# Patient Record
Sex: FEMALE | Race: BLACK OR AFRICAN AMERICAN | NOT HISPANIC OR LATINO | Employment: UNEMPLOYED | URBAN - METROPOLITAN AREA
[De-identification: names, ages, dates, MRNs, and addresses within clinical notes are randomized per-mention and may not be internally consistent; named-entity substitution may affect disease eponyms.]

---

## 2021-05-12 LAB — NONINV COLON CA DNA+OCC BLD SCRN STL QL: NEGATIVE

## 2021-06-02 LAB — BCS RECOMMENDATION EXT: NORMAL

## 2021-10-21 LAB
HUMAN PAPILLOMAVIRUS (HPV): NORMAL
PAP RECOMMENDATION EXT: NORMAL
PAP SMEAR: NORMAL

## 2021-12-15 LAB
BUN SERPL-MCNC: 20 MG/DL (ref 7–18)
CALCIUM SERPL-MCNC: 9.9 MG/DL (ref 8.5–10.1)
CHLORIDE SERPL-SCNC: 101 MMOL/L (ref 98–107)
CO2 SERPL-SCNC: 24 MMOL/L (ref 21–32)
CREAT SERPL-MCNC: 0.83 MG/DL (ref 0.6–1.3)
GLUCOSE SERPL-MCNC: 62 MG/DL (ref 74–106)
POTASSIUM SERPL-SCNC: 4.4 MMOL/L (ref 3.5–5.1)
SODIUM SERPL-SCNC: 140 MEQ/L (ref 131–145)

## 2021-12-21 LAB
LEFT EYE DM RETINOPATHY: NEGATIVE
RIGHT EYE DM RETINOPATHY: NEGATIVE

## 2022-01-13 ENCOUNTER — HISTORICAL (OUTPATIENT)
Dept: ADMINISTRATIVE | Facility: HOSPITAL | Age: 57
End: 2022-01-13

## 2022-02-08 ENCOUNTER — HISTORICAL (OUTPATIENT)
Dept: ADMINISTRATIVE | Facility: HOSPITAL | Age: 57
End: 2022-02-08

## 2022-04-07 ENCOUNTER — HISTORICAL (OUTPATIENT)
Dept: ADMINISTRATIVE | Facility: HOSPITAL | Age: 57
End: 2022-04-07

## 2022-04-24 VITALS
OXYGEN SATURATION: 99 % | BODY MASS INDEX: 24.84 KG/M2 | SYSTOLIC BLOOD PRESSURE: 117 MMHG | DIASTOLIC BLOOD PRESSURE: 75 MMHG | WEIGHT: 140.19 LBS | HEIGHT: 63 IN

## 2022-05-04 ENCOUNTER — TELEPHONE (OUTPATIENT)
Dept: FAMILY MEDICINE | Facility: CLINIC | Age: 57
End: 2022-05-04

## 2022-05-04 NOTE — TELEPHONE ENCOUNTER
----- Message from Dorie Royal sent at 5/4/2022  1:31 PM CDT -----  Regarding: Med refill  Pt is requesting a refill on Tramadol and Meloxicam.   [address]  [phone]

## 2022-05-18 ENCOUNTER — TELEPHONE (OUTPATIENT)
Dept: FAMILY MEDICINE | Facility: CLINIC | Age: 57
End: 2022-05-18

## 2022-05-18 ENCOUNTER — TELEPHONE (OUTPATIENT)
Dept: ADMINISTRATIVE | Facility: HOSPITAL | Age: 57
End: 2022-05-18

## 2022-05-18 RX ORDER — MELOXICAM 15 MG/1
15 TABLET ORAL DAILY
Qty: 30 TABLET | Refills: 0 | Status: SHIPPED | OUTPATIENT
Start: 2022-05-18 | End: 2022-07-06 | Stop reason: SDUPTHER

## 2022-06-30 ENCOUNTER — DOCUMENTATION ONLY (OUTPATIENT)
Dept: FAMILY MEDICINE | Facility: CLINIC | Age: 57
End: 2022-06-30
Payer: COMMERCIAL

## 2022-07-01 RX ORDER — BENAZEPRIL/HYDROCHLOROTHIAZIDE 20 MG-25MG
1 TABLET ORAL DAILY
COMMUNITY
Start: 2022-01-05 | End: 2023-01-04 | Stop reason: SDUPTHER

## 2022-07-01 RX ORDER — GLIPIZIDE 2.5 MG/1
2.5 TABLET, EXTENDED RELEASE ORAL DAILY
COMMUNITY
Start: 2021-12-08 | End: 2022-07-19

## 2022-07-01 RX ORDER — ERGOCALCIFEROL 1.25 MG/1
50000 CAPSULE ORAL
COMMUNITY
Start: 2022-02-01 | End: 2023-04-06 | Stop reason: SDUPTHER

## 2022-07-01 RX ORDER — EMPAGLIFLOZIN, LINAGLIPTIN, METFORMIN HYDROCHLORIDE 25; 5; 1000 MG/1; MG/1; MG/1
1 TABLET, EXTENDED RELEASE ORAL DAILY
COMMUNITY
Start: 2022-01-05 | End: 2022-07-06 | Stop reason: SDUPTHER

## 2022-07-01 RX ORDER — SIMVASTATIN 80 MG/1
80 TABLET, FILM COATED ORAL DAILY
COMMUNITY
Start: 2022-01-05 | End: 2023-01-04 | Stop reason: SDUPTHER

## 2022-07-06 ENCOUNTER — OFFICE VISIT (OUTPATIENT)
Dept: FAMILY MEDICINE | Facility: CLINIC | Age: 57
End: 2022-07-06
Payer: COMMERCIAL

## 2022-07-06 VITALS
HEART RATE: 76 BPM | SYSTOLIC BLOOD PRESSURE: 124 MMHG | DIASTOLIC BLOOD PRESSURE: 70 MMHG | RESPIRATION RATE: 18 BRPM | BODY MASS INDEX: 29.81 KG/M2 | OXYGEN SATURATION: 98 % | TEMPERATURE: 98 F | WEIGHT: 162 LBS | HEIGHT: 62 IN

## 2022-07-06 DIAGNOSIS — E11.59 HYPERTENSION ASSOCIATED WITH DIABETES: ICD-10-CM

## 2022-07-06 DIAGNOSIS — E11.69 HYPERLIPIDEMIA ASSOCIATED WITH TYPE 2 DIABETES MELLITUS: ICD-10-CM

## 2022-07-06 DIAGNOSIS — I15.2 HYPERTENSION ASSOCIATED WITH DIABETES: ICD-10-CM

## 2022-07-06 DIAGNOSIS — E11.65 CONTROLLED TYPE 2 DIABETES MELLITUS WITH HYPERGLYCEMIA, WITHOUT LONG-TERM CURRENT USE OF INSULIN: Primary | ICD-10-CM

## 2022-07-06 DIAGNOSIS — E78.5 HYPERLIPIDEMIA ASSOCIATED WITH TYPE 2 DIABETES MELLITUS: ICD-10-CM

## 2022-07-06 DIAGNOSIS — M54.16 BILATERAL LUMBAR RADICULOPATHY: ICD-10-CM

## 2022-07-06 PROCEDURE — 1160F PR REVIEW ALL MEDS BY PRESCRIBER/CLIN PHARMACIST DOCUMENTED: ICD-10-PCS | Mod: CPTII,,, | Performed by: FAMILY MEDICINE

## 2022-07-06 PROCEDURE — 3078F DIAST BP <80 MM HG: CPT | Mod: CPTII,,, | Performed by: FAMILY MEDICINE

## 2022-07-06 PROCEDURE — 99214 OFFICE O/P EST MOD 30 MIN: CPT | Mod: ,,, | Performed by: FAMILY MEDICINE

## 2022-07-06 PROCEDURE — 3008F BODY MASS INDEX DOCD: CPT | Mod: CPTII,,, | Performed by: FAMILY MEDICINE

## 2022-07-06 PROCEDURE — 3074F PR MOST RECENT SYSTOLIC BLOOD PRESSURE < 130 MM HG: ICD-10-PCS | Mod: CPTII,,, | Performed by: FAMILY MEDICINE

## 2022-07-06 PROCEDURE — 3074F SYST BP LT 130 MM HG: CPT | Mod: CPTII,,, | Performed by: FAMILY MEDICINE

## 2022-07-06 PROCEDURE — 3008F PR BODY MASS INDEX (BMI) DOCUMENTED: ICD-10-PCS | Mod: CPTII,,, | Performed by: FAMILY MEDICINE

## 2022-07-06 PROCEDURE — 1159F PR MEDICATION LIST DOCUMENTED IN MEDICAL RECORD: ICD-10-PCS | Mod: CPTII,,, | Performed by: FAMILY MEDICINE

## 2022-07-06 PROCEDURE — 1159F MED LIST DOCD IN RCRD: CPT | Mod: CPTII,,, | Performed by: FAMILY MEDICINE

## 2022-07-06 PROCEDURE — 99214 PR OFFICE/OUTPT VISIT, EST, LEVL IV, 30-39 MIN: ICD-10-PCS | Mod: ,,, | Performed by: FAMILY MEDICINE

## 2022-07-06 PROCEDURE — 3078F PR MOST RECENT DIASTOLIC BLOOD PRESSURE < 80 MM HG: ICD-10-PCS | Mod: CPTII,,, | Performed by: FAMILY MEDICINE

## 2022-07-06 PROCEDURE — 1160F RVW MEDS BY RX/DR IN RCRD: CPT | Mod: CPTII,,, | Performed by: FAMILY MEDICINE

## 2022-07-06 RX ORDER — TRAMADOL HYDROCHLORIDE 50 MG/1
50 TABLET ORAL EVERY 12 HOURS PRN
Qty: 60 TABLET | Refills: 1 | Status: SHIPPED | OUTPATIENT
Start: 2022-07-06 | End: 2022-09-04

## 2022-07-06 RX ORDER — PIOGLITAZONEHYDROCHLORIDE 15 MG/1
15 TABLET ORAL DAILY
COMMUNITY
End: 2022-07-06 | Stop reason: SINTOL

## 2022-07-06 RX ORDER — ASPIRIN 81 MG/1
81 TABLET ORAL DAILY
COMMUNITY

## 2022-07-06 RX ORDER — MELOXICAM 15 MG/1
15 TABLET ORAL DAILY PRN
Qty: 90 TABLET | Refills: 3 | Status: SHIPPED | OUTPATIENT
Start: 2022-07-06 | End: 2023-05-16

## 2022-07-06 RX ORDER — METFORMIN HYDROCHLORIDE 1000 MG/1
1000 TABLET ORAL 2 TIMES DAILY WITH MEALS
COMMUNITY
End: 2022-07-06 | Stop reason: SINTOL

## 2022-07-06 RX ORDER — FERROUS SULFATE 325(65) MG
325 TABLET, DELAYED RELEASE (ENTERIC COATED) ORAL
COMMUNITY
End: 2023-05-16

## 2022-07-06 RX ORDER — EMPAGLIFLOZIN, LINAGLIPTIN, METFORMIN HYDROCHLORIDE 25; 5; 1000 MG/1; MG/1; MG/1
1 TABLET, EXTENDED RELEASE ORAL DAILY
Qty: 90 TABLET | Refills: 3 | Status: SHIPPED | OUTPATIENT
Start: 2022-07-06 | End: 2022-07-18

## 2022-07-06 NOTE — PROGRESS NOTES
Patient ID: [id number]     Chief Complaint: Diabetes, Hyperlipidemia, and Hypertension        HPI:     Cathie Santos is a 56 y.o. female here today for a follow up.   - Here for followup DM II. DM II is stable and asymptomatic, but she reports side effects with Metformin, and Actos and wants to go back on Trijardy XR, which worked well for her in the past and she takes Glipizide as well without side effects, her fasting CBGs have been 100's, she admits that she has been non-compliant with diet, but plans to get back on track. She had labs done on 06/29/2022, here to discuss lab results. She is UTD on DM II eye exam.   - Here for followup hypercholesterolemia. She is compliant with diet and Rx as prescribed, no side effects. She will do her labs in 3 months with her next lab draw because labs weren't drawn on 06/29/2022.   - Here for followup HTN, BP is controlled with Rx, no side effects, asymptomatic, BP has been 120's/70's.   - She has chronic lumbar radiculopathy, controlled with Tramadol and Meloxicam for now, no side effects, she needs Rx refill today, she is still awaiting appointment with Neurosurgery, needs new referral.   - Patient is without any other complaints today.      Past Medical History:  Anemia, unspecified  HTN (hypertension)  Hypercholesterolemia  RUFINO (obstructive sleep apnea)  Type 2 diabetes mellitus without complications  Vitamin D deficiency     Past Surgical History:   Procedure Laterality Date    CHOLECYSTECTOMY         Review of patient's allergies indicates:  No Known Allergies    Outpatient Medications Marked as Taking for the 7/6/22 encounter (Office Visit) with Stacey Hendrickson MD   Medication Sig Dispense Refill    aspirin (ECOTRIN) 81 MG EC tablet Take 81 mg by mouth once daily.      benazepril-hydrochlorthiazide (LOTENSIN HCT) 20-25 mg Tab Take 1 tablet by mouth once daily.      ergocalciferol (ERGOCALCIFEROL) 50,000 unit Cap Take 50,000 Int'l Units by mouth every 7  days.      ferrous sulfate 325 (65 FE) MG EC tablet Take 325 mg by mouth 3 (three) times daily with meals.      glipiZIDE (GLUCOTROL) 2.5 MG TR24 Take 2.5 mg by mouth once daily.      simvastatin (ZOCOR) 80 MG tablet Take 80 mg by mouth once daily.      [DISCONTINUED] meloxicam (MOBIC) 15 MG tablet Take 1 tablet (15 mg total) by mouth once daily. 30 tablet 0    [DISCONTINUED] metFORMIN (GLUCOPHAGE) 1000 MG tablet Take 1,000 mg by mouth 2 (two) times daily with meals.      [DISCONTINUED] pioglitazone (ACTOS) 15 MG tablet Take 15 mg by mouth once daily.         Social History     Socioeconomic History    Marital status: Unknown   Tobacco Use    Smoking status: Never Smoker    Smokeless tobacco: Never Used   Substance and Sexual Activity    Alcohol use: Yes    Drug use: Never    Sexual activity: Yes        Family History   Problem Relation Age of Onset    Diabetes Mother     Diabetes Sister     Diabetes Brother         Subjective:       Review of Systems:    See HPI for details    Constitutional: Denies Change in appetite. Denies Chills. Denies Fever. Denies Night sweats.  Eye: Denies Blurred vision. Denies Discharge. Denies Eye pain.  ENT: Denies Decreased hearing. Denies Sore throat. Denies Swollen glands.  Respiratory: Denies Cough. Denies Shortness of breath. Denies Shortness of breath with exertion. Denies Wheezing.  Cardiovascular: Denies Chest pain at rest. Denies Chest pain with exertion. Denies Irregular heartbeat. Denies Palpitations.  Gastrointestinal: Denies Abdominal pain. Denies Diarrhea. Denies Nausea. Denies Vomiting. Denies Hematemesis or Hematochezia.  Genitourinary: Denies Dysuria. Denies Urinary frequency. Denies Urinary urgency. Denies Blood in urine.  Endocrine: Denies Cold intolerance. Denies Excessive thirst. Denies Heat intolerance. Denies Weight loss. Denies Weight gain.  Musculoskeletal: Denies Painful joints. Denies Weakness.  Integumentary: Denies Rash. Denies Itching. Denies  "Dry skin.  Neurologic: Denies Dizziness. Denies Fainting. Denies Headache.  Psychiatric: Denies Depression. Denies Anxiety. Denies Suicidal/Homicidal ideations.    All Other ROS: Negative except as stated in HPI.       Objective:     /70 (BP Location: Right arm, Patient Position: Sitting, BP Method: Medium (Manual))   Pulse 76   Temp 98.2 °F (36.8 °C) (Oral)   Resp 18   Ht 5' 2" (1.575 m)   Wt 73.5 kg (162 lb)   SpO2 98%   BMI 29.63 kg/m²     Physical Exam    General: Alert and oriented, No acute distress. Overweight.   Head: Normocephalic, Atraumatic.  Eye: Pupils are equal, round and reactive to light, Extraocular movements are intact, Sclera non-icteric.  Ears/Nose/Throat: Normal, Mucosa moist,Clear.  Neck/Thyroid: Supple, Non-tender, No carotid bruit, No palpable thyromegaly or thyroid nodule, No lymphadenopathy, No JVD, Full range of motion.  Respiratory: Clear to auscultation bilaterally; No wheezes, rales or rhonchi,Non-labored respirations, Symmetrical chest wall expansion.  Cardiovascular: Regular rate and rhythm, S1/S2 normal, No murmurs, rubs or gallops.  Gastrointestinal: Soft, Non-tender, Non-distended, Normal bowel sounds, No palpable organomegaly.  Musculoskeletal: Normal range of motion.  Integumentary: Warm, Dry, Intact, No suspicious lesions or rashes.  Extremities: No clubbing, cyanosis or edema  Neurologic: No focal deficits, Cranial Nerves II-XII are grossly intact, Motor strength normal upper and lower extremities, Sensory exam intact.  Psychiatric: Normal interaction, Coherent speech, Euthymic mood, Appropriate affect         Assessment:       ICD-10-CM ICD-9-CM   1. Controlled type 2 diabetes mellitus with hyperglycemia, without long-term current use of insulin  E11.65 250.80     790.29   2. Hypertension associated with diabetes  E11.59 250.80    I15.2 401.9   3. Hyperlipidemia associated with type 2 diabetes mellitus  E11.69 250.80    E78.5 272.4   4. Bilateral lumbar " radiculopathy  M54.16 724.4        Plan:     Problem List Items Addressed This Visit        Neuro    Bilateral lumbar radiculopathy    Relevant Medications    meloxicam (MOBIC) 15 MG tablet    traMADoL (ULTRAM) 50 mg tablet    Other Relevant Orders    Ambulatory referral/consult to Neurosurgery       Cardiac/Vascular    Hypertension associated with diabetes    Relevant Medications    glipiZIDE (GLUCOTROL) 2.5 MG TR24    Other Relevant Orders    CBC Auto Differential    Hyperlipidemia associated with type 2 diabetes mellitus    Relevant Medications    glipiZIDE (GLUCOTROL) 2.5 MG TR24    Other Relevant Orders    CK    Comprehensive Metabolic Panel    Lipid Panel       Endocrine    Controlled type 2 diabetes mellitus with hyperglycemia, without long-term current use of insulin - Primary    Relevant Medications    glipiZIDE (GLUCOTROL) 2.5 MG TR24    empaglifloz-linaglip-metformin (TRIJARDY XR) 25-5-1,000 mg TBph    Other Relevant Orders    Hemoglobin A1C       1. Controlled type 2 diabetes mellitus with hyperglycemia, without long-term current use of insulin  - empaglifloz-linaglip-metformin (TRIJARDY XR) 25-5-1,000 mg TBph; Take 1 tablet by mouth once daily.  Dispense: 90 tablet; Refill: 3  - Hemoglobin A1C; Future  - DM II is not controlled, HgA1C: 7.5%, goal <6.5%, needs to continue ADA diet, exercise, continue Glipizide as prescribed, discontinue Actos and Metformin due to side effects, Rx trial of resuming Trijardy with close monitoring, recheck CMP, HgA1C in 10/2022. Keep daily fasting CBG log. Keep appointment for annual eye exam as scheduled. Notify M.D. or ER if CBG >400 or <60, polyuria, polydipsia, or polyphagia. Notify M.D. or ER if symptoms persist or worsen, temp greater than 100.4, or any acute illness.   No results found for: LABA1C, HGBA1C   Continue   On ACE and Statin according to guidelines.  Follow ADA Diet. Avoid soda, simple sweets, and limit rice/pasta/breads/starches.  Maintain healthy weight  with goal BMI <30.  Exercise 5 times per week for 30 minutes per day.  Stressed importance of daily foot exams.  Stressed importance of annual dilated eye exam.    2. Hypertension associated with diabetes  - CBC Auto Differential; Future  - BP is well controlled. Continue sodium diet. Continue BP Rx as prescribed. Keep daily BP log. Notify M.D. or ER if BP >170/100 or <90/60, chest pain, palpitations, headache, SOB, temp greater than 100.4, or any acute illness.   Continue  Low Sodium Diet (DASH Diet - Less than 2 grams of sodium per day).  Monitor blood pressure daily and log. Report consistent numbers greater than 140/90.  Smoking cessation encouraged to aid in BP reduction.  Maintain healthy weight with goal BMI <30. Exercise 30 minutes per day, 5 days per week.    3. Hyperlipidemia associated with type 2 diabetes mellitus  - CK; Future  - Comprehensive Metabolic Panel; Future  - Lipid Panel; Future  - Cholesterol is well controlled, LDL: 87,NL<100, needs low cholesterol diet, exercise, and cholesterol Rx as prescribed, recheck FLP in 10/2022.     4. Bilateral lumbar radiculopathy  - Ambulatory referral/consult to Neurosurgery; Future  - meloxicam (MOBIC) 15 MG tablet; Take 1 tablet (15 mg total) by mouth daily as needed for Pain.  Dispense: 90 tablet; Refill: 3  - traMADoL (ULTRAM) 50 mg tablet; Take 1 tablet (50 mg total) by mouth every 12 (twelve) hours as needed for Pain.  Dispense: 60 tablet; Refill: 1  - Rx Meloxicam and Tramadol refilled today until patient can see Neurosurgery. Neurosurgery referral is in file. Controlled Rx agreement is in file. Notify M.D. or ER if symptoms persist or worsen, SI/HI, temp >100.4, or any acute illness.       Cathie was seen today for diabetes, hyperlipidemia and hypertension.    Diagnoses and all orders for this visit:    Controlled type 2 diabetes mellitus with hyperglycemia, without long-term current use of insulin  -     empaglifloz-linaglip-metformin (TRIJARDY XR)  25-5-1,000 mg TBph; Take 1 tablet by mouth once daily.  -     Hemoglobin A1C; Future    Hypertension associated with diabetes  -     CBC Auto Differential; Future    Hyperlipidemia associated with type 2 diabetes mellitus  -     CK; Future  -     Comprehensive Metabolic Panel; Future  -     Lipid Panel; Future    Bilateral lumbar radiculopathy  -     Ambulatory referral/consult to Neurosurgery; Future  -     meloxicam (MOBIC) 15 MG tablet; Take 1 tablet (15 mg total) by mouth daily as needed for Pain.  -     traMADoL (ULTRAM) 50 mg tablet; Take 1 tablet (50 mg total) by mouth every 12 (twelve) hours as needed for Pain.          Medication List with Changes/Refills   New Medications    TRAMADOL (ULTRAM) 50 MG TABLET    Take 1 tablet (50 mg total) by mouth every 12 (twelve) hours as needed for Pain.       Start Date: 7/6/2022  End Date: 9/4/2022   Current Medications    ASPIRIN (ECOTRIN) 81 MG EC TABLET    Take 81 mg by mouth once daily.       Start Date: --        End Date: --    BENAZEPRIL-HYDROCHLORTHIAZIDE (LOTENSIN HCT) 20-25 MG TAB    Take 1 tablet by mouth once daily.       Start Date: 1/5/2022  End Date: --    ERGOCALCIFEROL (ERGOCALCIFEROL) 50,000 UNIT CAP    Take 50,000 Int'l Units by mouth every 7 days.       Start Date: 2/1/2022  End Date: --    FERROUS SULFATE 325 (65 FE) MG EC TABLET    Take 325 mg by mouth 3 (three) times daily with meals.       Start Date: --        End Date: --    GLIPIZIDE (GLUCOTROL) 2.5 MG TR24    Take 2.5 mg by mouth once daily.       Start Date: 12/8/2021 End Date: --    SIMVASTATIN (ZOCOR) 80 MG TABLET    Take 80 mg by mouth once daily.       Start Date: 1/5/2022  End Date: --   Changed and/or Refilled Medications    Modified Medication Previous Medication    EMPAGLIFLOZ-LINAGLIP-METFORMIN (TRIJARDY XR) 25-5-1,000 MG TBPH empaglifloz-linaglip-metformin (TRIJARDY XR) 25-5-1,000 mg TBph       Take 1 tablet by mouth once daily.    Take 1 tablet by mouth once daily.       Start  Date: 7/6/2022  End Date: 7/6/2023    Start Date: 1/5/2022  End Date: 7/6/2022    MELOXICAM (MOBIC) 15 MG TABLET meloxicam (MOBIC) 15 MG tablet       Take 1 tablet (15 mg total) by mouth daily as needed for Pain.    Take 1 tablet (15 mg total) by mouth once daily.       Start Date: 7/6/2022  End Date: 7/6/2023    Start Date: 5/18/2022 End Date: 7/6/2022   Discontinued Medications    METFORMIN (GLUCOPHAGE) 1000 MG TABLET    Take 1,000 mg by mouth 2 (two) times daily with meals.       Start Date: --        End Date: 7/6/2022    PIOGLITAZONE (ACTOS) 15 MG TABLET    Take 15 mg by mouth once daily.       Start Date: --        End Date: 7/6/2022          Follow up in about 3 months (around 10/6/2022) for Diabetes Followup, Cholesterol Followup, labs- 30 minute appointment.

## 2022-07-15 ENCOUNTER — TELEPHONE (OUTPATIENT)
Dept: FAMILY MEDICINE | Facility: CLINIC | Age: 57
End: 2022-07-15
Payer: COMMERCIAL

## 2022-07-15 DIAGNOSIS — E11.65 TYPE 2 DIABETES MELLITUS WITH HYPERGLYCEMIA, WITHOUT LONG-TERM CURRENT USE OF INSULIN: Primary | ICD-10-CM

## 2022-07-15 NOTE — TELEPHONE ENCOUNTER
Trijardy is to expensive, with insurance/coupon card. Still over 400$. Is there anything else that can be sent in.   Patient was taken off of Metformin and Actos at last visit     ADDENDUM:  I have signed for the following orders AND/OR meds.  Please call the patient and ask the patient to schedule the testing AND/OR inform about any medications that were sent.        Medications Ordered This Encounter   Medications    empagliflozin (JARDIANCE) 25 mg tablet     Sig: Take 1 tablet (25 mg total) by mouth once daily.     Dispense:  30 tablet     Refill:  3    linaGLIPtin (TRADJENTA) 5 mg Tab tablet     Sig: Take 1 tablet (5 mg total) by mouth once daily.     Dispense:  30 tablet     Refill:  3    metFORMIN (GLUMETZA) 1000 MG (MOD) 24hr tablet     Sig: Take 1 tablet (1,000 mg total) by mouth daily with breakfast.     Dispense:  30 tablet     Refill:  3   Scotty Shanks MD

## 2022-07-18 ENCOUNTER — PATIENT MESSAGE (OUTPATIENT)
Dept: FAMILY MEDICINE | Facility: CLINIC | Age: 57
End: 2022-07-18
Payer: COMMERCIAL

## 2022-07-18 ENCOUNTER — TELEPHONE (OUTPATIENT)
Dept: ADMINISTRATIVE | Facility: HOSPITAL | Age: 57
End: 2022-07-18
Payer: COMMERCIAL

## 2022-07-18 DIAGNOSIS — E11.65 CONTROLLED TYPE 2 DIABETES MELLITUS WITH HYPERGLYCEMIA, WITHOUT LONG-TERM CURRENT USE OF INSULIN: Primary | ICD-10-CM

## 2022-07-18 RX ORDER — LINAGLIPTIN 5 MG/1
5 TABLET, FILM COATED ORAL DAILY
Qty: 30 TABLET | Refills: 3 | Status: SHIPPED | OUTPATIENT
Start: 2022-07-18 | End: 2022-10-18

## 2022-07-18 RX ORDER — EMPAGLIFLOZIN 25 MG/1
25 TABLET, FILM COATED ORAL DAILY
Qty: 30 TABLET | Refills: 3 | Status: SHIPPED | OUTPATIENT
Start: 2022-07-18 | End: 2022-10-18

## 2022-07-18 RX ORDER — METFORMIN HYDROCHLORIDE 1000 MG/1
1000 TABLET, FILM COATED, EXTENDED RELEASE ORAL
Qty: 30 TABLET | Refills: 3 | Status: SHIPPED | OUTPATIENT
Start: 2022-07-18 | End: 2022-07-19

## 2022-07-18 NOTE — TELEPHONE ENCOUNTER
Type:  Needs Medical Advice    Who Called: self  Symptoms (please be specific): na   How long has patient had these symptoms:  na  Pharmacy name and phone #:  na  Would the patient rather a call back or a response via [facility]? Call back  Best Call Back Number: [phone]  Additional Information: pt called for jesseia stated that she was told to giver her a call back after talking to her insurance

## 2022-07-19 RX ORDER — METFORMIN HYDROCHLORIDE 1000 MG/1
1000 TABLET, FILM COATED, EXTENDED RELEASE ORAL
Qty: 90 TABLET | Refills: 3 | Status: SHIPPED | OUTPATIENT
Start: 2022-07-19 | End: 2022-10-18

## 2022-07-19 RX ORDER — GLIPIZIDE 5 MG/1
5 TABLET, FILM COATED, EXTENDED RELEASE ORAL
Qty: 90 TABLET | Refills: 3 | Status: SHIPPED | OUTPATIENT
Start: 2022-07-19 | End: 2023-06-14

## 2022-07-27 ENCOUNTER — TELEPHONE (OUTPATIENT)
Dept: FAMILY MEDICINE | Facility: CLINIC | Age: 57
End: 2022-07-27
Payer: COMMERCIAL

## 2022-07-27 NOTE — TELEPHONE ENCOUNTER
----- Message from Es Gray sent at 7/27/2022  3:35 PM CDT -----  Regarding: med adv  Type:  Needs Medical Advice    Who Called: Cathie  Symptoms (please be specific):   How long has patient had these symptoms:    Pharmacy name and phone #:    Would the patient rather a call back or a response via [facility]?   Best Call Back Number: [phone]  Additional Information: patient called to see if lab results were received

## 2022-08-03 ENCOUNTER — OFFICE VISIT (OUTPATIENT)
Dept: FAMILY MEDICINE | Facility: CLINIC | Age: 57
End: 2022-08-03
Payer: COMMERCIAL

## 2022-08-03 VITALS
HEIGHT: 62 IN | BODY MASS INDEX: 28.71 KG/M2 | DIASTOLIC BLOOD PRESSURE: 70 MMHG | RESPIRATION RATE: 18 BRPM | TEMPERATURE: 98 F | SYSTOLIC BLOOD PRESSURE: 120 MMHG | WEIGHT: 156 LBS | HEART RATE: 69 BPM | OXYGEN SATURATION: 98 %

## 2022-08-03 DIAGNOSIS — M79.601 BILATERAL ARM PAIN: Primary | ICD-10-CM

## 2022-08-03 DIAGNOSIS — Z12.31 VISIT FOR SCREENING MAMMOGRAM: ICD-10-CM

## 2022-08-03 DIAGNOSIS — M54.12 CERVICAL RADICULOPATHY: ICD-10-CM

## 2022-08-03 DIAGNOSIS — M79.602 BILATERAL ARM PAIN: Primary | ICD-10-CM

## 2022-08-03 PROCEDURE — 3074F PR MOST RECENT SYSTOLIC BLOOD PRESSURE < 130 MM HG: ICD-10-PCS | Mod: CPTII,,, | Performed by: FAMILY MEDICINE

## 2022-08-03 PROCEDURE — 3078F DIAST BP <80 MM HG: CPT | Mod: CPTII,,, | Performed by: FAMILY MEDICINE

## 2022-08-03 PROCEDURE — 4010F PR ACE/ARB THEARPY RXD/TAKEN: ICD-10-PCS | Mod: CPTII,,, | Performed by: FAMILY MEDICINE

## 2022-08-03 PROCEDURE — 1159F MED LIST DOCD IN RCRD: CPT | Mod: CPTII,,, | Performed by: FAMILY MEDICINE

## 2022-08-03 PROCEDURE — 1159F PR MEDICATION LIST DOCUMENTED IN MEDICAL RECORD: ICD-10-PCS | Mod: CPTII,,, | Performed by: FAMILY MEDICINE

## 2022-08-03 PROCEDURE — 3008F BODY MASS INDEX DOCD: CPT | Mod: CPTII,,, | Performed by: FAMILY MEDICINE

## 2022-08-03 PROCEDURE — 3074F SYST BP LT 130 MM HG: CPT | Mod: CPTII,,, | Performed by: FAMILY MEDICINE

## 2022-08-03 PROCEDURE — 99214 PR OFFICE/OUTPT VISIT, EST, LEVL IV, 30-39 MIN: ICD-10-PCS | Mod: ,,, | Performed by: FAMILY MEDICINE

## 2022-08-03 PROCEDURE — 99214 OFFICE O/P EST MOD 30 MIN: CPT | Mod: ,,, | Performed by: FAMILY MEDICINE

## 2022-08-03 PROCEDURE — 3008F PR BODY MASS INDEX (BMI) DOCUMENTED: ICD-10-PCS | Mod: CPTII,,, | Performed by: FAMILY MEDICINE

## 2022-08-03 PROCEDURE — 1160F RVW MEDS BY RX/DR IN RCRD: CPT | Mod: CPTII,,, | Performed by: FAMILY MEDICINE

## 2022-08-03 PROCEDURE — 3078F PR MOST RECENT DIASTOLIC BLOOD PRESSURE < 80 MM HG: ICD-10-PCS | Mod: CPTII,,, | Performed by: FAMILY MEDICINE

## 2022-08-03 PROCEDURE — 1160F PR REVIEW ALL MEDS BY PRESCRIBER/CLIN PHARMACIST DOCUMENTED: ICD-10-PCS | Mod: CPTII,,, | Performed by: FAMILY MEDICINE

## 2022-08-03 PROCEDURE — 4010F ACE/ARB THERAPY RXD/TAKEN: CPT | Mod: CPTII,,, | Performed by: FAMILY MEDICINE

## 2022-08-03 RX ORDER — CYCLOBENZAPRINE HCL 5 MG
5 TABLET ORAL 3 TIMES DAILY PRN
Qty: 30 TABLET | Refills: 0 | Status: SHIPPED | OUTPATIENT
Start: 2022-08-03 | End: 2022-08-13

## 2022-08-03 NOTE — PROGRESS NOTES
Patient ID: [id number]     Chief Complaint: Arm Pain (Bilateral Arm Pain x 1 month )        HPI:     Cathie Santos is a 56 y.o. female here today for bilateral upper arm pain x 1 month.   - She reports that pain is aching, sometimes with numbness, worse with activity, constant, fluctuates in intensity. She denies injury, but she does report that she  35 bottle pack of water when needed. She reports pain in her neck as well and radiates to both of her shoulders. Pain is 5-7/10 on pain scale. She reports that she drops items sometimes because her hand gets weak. She is right hand dominant, but the left is worse than the right. She reports some improvement with OTC Tylenol, Tramadol and Meloxicam as directed. She hasn't had XR on her neck, PT, or UE NCS/EMG in the past.   - She needs MMG ordered at [facility].   - Patient is without any other complaints today.       ----------------------------  Anemia, unspecified  HTN (hypertension)  Hypercholesterolemia  RUFINO (obstructive sleep apnea)  Type 2 diabetes mellitus without complications  Vitamin D deficiency     Past Surgical History:   Procedure Laterality Date    CHOLECYSTECTOMY         Review of patient's allergies indicates:  No Known Allergies    Outpatient Medications Marked as Taking for the 8/3/22 encounter (Office Visit) with Stacey Hendrickson MD   Medication Sig Dispense Refill    aspirin (ECOTRIN) 81 MG EC tablet Take 81 mg by mouth once daily.      benazepril-hydrochlorthiazide (LOTENSIN HCT) 20-25 mg Tab Take 1 tablet by mouth once daily.      empagliflozin (JARDIANCE) 25 mg tablet Take 1 tablet (25 mg total) by mouth once daily. 30 tablet 3    ergocalciferol (ERGOCALCIFEROL) 50,000 unit Cap Take 50,000 Int'l Units by mouth every 7 days.      ferrous sulfate 325 (65 FE) MG EC tablet Take 325 mg by mouth 3 (three) times daily with meals.      glipiZIDE (GLUCOTROL) 5 MG TR24 Take 1 tablet (5 mg total) by mouth daily with  breakfast. 90 tablet 3    linaGLIPtin (TRADJENTA) 5 mg Tab tablet Take 1 tablet (5 mg total) by mouth once daily. 30 tablet 3    meloxicam (MOBIC) 15 MG tablet Take 1 tablet (15 mg total) by mouth daily as needed for Pain. 90 tablet 3    metFORMIN (GLUMETZA) 1000 MG (MOD) 24hr tablet Take 1 tablet (1,000 mg total) by mouth daily with breakfast. 90 tablet 3    simvastatin (ZOCOR) 80 MG tablet Take 80 mg by mouth once daily.      traMADoL (ULTRAM) 50 mg tablet Take 1 tablet (50 mg total) by mouth every 12 (twelve) hours as needed for Pain. 60 tablet 1       Social History     Socioeconomic History    Marital status: Unknown   Tobacco Use    Smoking status: Never Smoker    Smokeless tobacco: Never Used   Substance and Sexual Activity    Alcohol use: Yes    Drug use: Never    Sexual activity: Yes        Family History   Problem Relation Age of Onset    Diabetes Mother     Diabetes Sister     Diabetes Brother         Subjective:       Review of Systems:    See HPI for details    Constitutional: Denies Change in appetite. Denies Chills. Denies Fever. Denies Night sweats.  Eye: Denies Blurred vision. Denies Discharge. Denies Eye pain.  ENT: Denies Decreased hearing. Denies Sore throat. Denies Swollen glands.  Respiratory: Denies Cough. Denies Shortness of breath. Denies Shortness of breath with exertion. Denies Wheezing.  Cardiovascular: Denies Chest pain at rest. Denies Chest pain with exertion. Denies Irregular heartbeat. Denies Palpitations.  Gastrointestinal: Denies Abdominal pain. Denies Diarrhea. Denies Nausea. Denies Vomiting. Denies Hematemesis or Hematochezia.  Genitourinary: Denies Dysuria. Denies Urinary frequency. Denies Urinary urgency. Denies Blood in urine.  Endocrine: Denies Cold intolerance. Denies Excessive thirst. Denies Heat intolerance. Denies Weight loss. Denies Weight gain.  Musculoskeletal: As per HPI.  Integumentary: Denies Rash. Denies Itching. Denies Dry skin.  Neurologic: Denies  "Dizziness. Denies Fainting. Denies Headache.  Psychiatric: Denies Depression. Denies Anxiety. Denies Suicidal/Homicidal ideations.    All Other ROS: Negative except as stated in HPI.       Objective:     /70 (BP Location: Right arm, Patient Position: Sitting, BP Method: Medium (Automatic))   Pulse 69   Temp 98.2 °F (36.8 °C) (Oral)   Resp 18   Ht 5' 2" (1.575 m)   Wt 70.8 kg (156 lb)   SpO2 98%   BMI 28.53 kg/m²     Physical Exam    General: Alert and oriented, No acute distress.  Head: Normocephalic, Atraumatic.  Eye: Pupils are equal, round and reactive to light, Extraocular movements are intact, Sclera non-icteric.  Ears/Nose/Throat: Normal, Mucosa moist,Clear.  Neck/Thyroid: Supple, Non-tender, No carotid bruit, No palpable thyromegaly or thyroid nodule, No lymphadenopathy, No JVD, Full range of motion.  Respiratory: Clear to auscultation bilaterally; No wheezes, rales or rhonchi,Non-labored respirations, Symmetrical chest wall expansion.  Cardiovascular: Regular rate and rhythm, S1/S2 normal, No murmurs, rubs or gallops.  Gastrointestinal: Soft, Non-tender, Non-distended, Normal bowel sounds, No palpable organomegaly.  Musculoskeletal: Normal range of motion. C-spine with FROM, mild posterior tenderness, no deformity, no erythema, no effusion. Bilateral upper arms with mild TTP, FROM, no deformity, no erythema, no effusion.   Integumentary: Warm, Dry, Intact, No suspicious lesions or rashes.  Extremities: No clubbing, cyanosis or edema  Neurologic: No focal deficits, Cranial Nerves II-XII are grossly intact, Motor strength normal upper and lower extremities, Sensory exam intact.  Psychiatric: Normal interaction, Coherent speech, Euthymic mood, Appropriate affect         Assessment:       ICD-10-CM ICD-9-CM   1. Bilateral arm pain  M79.601 729.5    M79.602    2. Cervical radiculopathy  M54.12 723.4   3. Visit for screening mammogram  Z12.31 V76.12        Plan:     Problem List Items Addressed This " Visit    None     Visit Diagnoses     Bilateral arm pain    -  Primary    Relevant Medications    cyclobenzaprine (FLEXERIL) 5 MG tablet    Other Relevant Orders    X-Ray Cervical Spine AP And Lateral    Cervical radiculopathy        Relevant Medications    cyclobenzaprine (FLEXERIL) 5 MG tablet    Other Relevant Orders    X-Ray Cervical Spine AP And Lateral    Visit for screening mammogram        Relevant Orders    Mammo Digital Screening Bilat w/ [facility]       1. Bilateral arm pain  - X-Ray Cervical Spine AP And Lateral; Future  - cyclobenzaprine (FLEXERIL) 5 MG tablet; Take 1 tablet (5 mg total) by mouth 3 (three) times daily as needed for Muscle spasms. *may cause drowsiness*  Dispense: 30 tablet; Refill: 0  - Rx trial of Flexeril p.r.n. muscle spasms. Continue Meloxicam p.r.n. pain/inflammation. Only take Tramadol sparing p.r.n. severe pain only that is not controlled with Meloxicam or Flexeril. XR C-spine. If XR is normal, will proceed with PT referral and consider Neurology referral for UE NCS/EMG. Stretching exercises encouraged. Notify M.D. or ER if symptoms persist or worsen, temp >100.4, or any acute illness.     2. Cervical radiculopathy  - X-Ray Cervical Spine AP And Lateral; Future  - cyclobenzaprine (FLEXERIL) 5 MG tablet; Take 1 tablet (5 mg total) by mouth 3 (three) times daily as needed for Muscle spasms. *may cause drowsiness*  Dispense: 30 tablet; Refill: 0  - Same as #1.     3. Visit for screening mammogram  - Mammo Digital Screening Bilat w/ [facility]; Future  - Monthly breast self exam encouraged.       Cathie was seen today for arm pain.    Diagnoses and all orders for this visit:    Bilateral arm pain  -     X-Ray Cervical Spine AP And Lateral; Future  -     cyclobenzaprine (FLEXERIL) 5 MG tablet; Take 1 tablet (5 mg total) by mouth 3 (three) times daily as needed for Muscle spasms. *may cause drowsiness*    Cervical radiculopathy  -     X-Ray Cervical Spine AP And Lateral; Future  -      cyclobenzaprine (FLEXERIL) 5 MG tablet; Take 1 tablet (5 mg total) by mouth 3 (three) times daily as needed for Muscle spasms. *may cause drowsiness*    Visit for screening mammogram  -     Mammo Digital Screening Bilat w/ [facility]; Future          Medication List with Changes/Refills   New Medications    CYCLOBENZAPRINE (FLEXERIL) 5 MG TABLET    Take 1 tablet (5 mg total) by mouth 3 (three) times daily as needed for Muscle spasms. *may cause drowsiness*       Start Date: 8/3/2022  End Date: 8/13/2022   Current Medications    ASPIRIN (ECOTRIN) 81 MG EC TABLET    Take 81 mg by mouth once daily.       Start Date: --        End Date: --    BENAZEPRIL-HYDROCHLORTHIAZIDE (LOTENSIN HCT) 20-25 MG TAB    Take 1 tablet by mouth once daily.       Start Date: 1/5/2022  End Date: --    EMPAGLIFLOZIN (JARDIANCE) 25 MG TABLET    Take 1 tablet (25 mg total) by mouth once daily.       Start Date: 7/18/2022 End Date: --    ERGOCALCIFEROL (ERGOCALCIFEROL) 50,000 UNIT CAP    Take 50,000 Int'l Units by mouth every 7 days.       Start Date: 2/1/2022  End Date: --    FERROUS SULFATE 325 (65 FE) MG EC TABLET    Take 325 mg by mouth 3 (three) times daily with meals.       Start Date: --        End Date: --    GLIPIZIDE (GLUCOTROL) 5 MG TR24    Take 1 tablet (5 mg total) by mouth daily with breakfast.       Start Date: 7/19/2022 End Date: 7/19/2023    LINAGLIPTIN (TRADJENTA) 5 MG TAB TABLET    Take 1 tablet (5 mg total) by mouth once daily.       Start Date: 7/18/2022 End Date: --    MELOXICAM (MOBIC) 15 MG TABLET    Take 1 tablet (15 mg total) by mouth daily as needed for Pain.       Start Date: 7/6/2022  End Date: 7/6/2023    METFORMIN (GLUMETZA) 1000 MG (MOD) 24HR TABLET    Take 1 tablet (1,000 mg total) by mouth daily with breakfast.       Start Date: 7/19/2022 End Date: 7/19/2023    SIMVASTATIN (ZOCOR) 80 MG TABLET    Take 80 mg by mouth once daily.       Start Date: 1/5/2022  End Date: --    TRAMADOL (ULTRAM) 50 MG TABLET    Take 1 tablet  (50 mg total) by mouth every 12 (twelve) hours as needed for Pain.       Start Date: 7/6/2022  End Date: 9/4/2022          Follow up if symptoms worsen or fail to improve.

## 2022-08-09 ENCOUNTER — DOCUMENTATION ONLY (OUTPATIENT)
Dept: ADMINISTRATIVE | Facility: HOSPITAL | Age: 57
End: 2022-08-09
Payer: COMMERCIAL

## 2022-09-15 ENCOUNTER — HISTORICAL (OUTPATIENT)
Dept: ADMINISTRATIVE | Facility: HOSPITAL | Age: 57
End: 2022-09-15
Payer: COMMERCIAL

## 2022-10-10 ENCOUNTER — TELEPHONE (OUTPATIENT)
Dept: FAMILY MEDICINE | Facility: CLINIC | Age: 57
End: 2022-10-10
Payer: COMMERCIAL

## 2022-10-10 NOTE — TELEPHONE ENCOUNTER
----- Message from Es Gray sent at 10/10/2022  2:01 PM CDT -----  Regarding: med adv  Type:  Needs Medical Advice    Who Called: patient  Symptoms (please be specific):    How long has patient had these symptoms:    Pharmacy name and phone #:    Would the patient rather a call back or a response via [facility]?   Best Call Back Number: [phone]  Additional Information: Please call patient back to discuss her appointments.

## 2022-10-12 ENCOUNTER — TELEPHONE (OUTPATIENT)
Dept: FAMILY MEDICINE | Facility: CLINIC | Age: 57
End: 2022-10-12
Payer: COMMERCIAL

## 2022-10-12 DIAGNOSIS — E11.65 CONTROLLED TYPE 2 DIABETES MELLITUS WITH HYPERGLYCEMIA, WITHOUT LONG-TERM CURRENT USE OF INSULIN: Primary | ICD-10-CM

## 2022-10-13 ENCOUNTER — TELEPHONE (OUTPATIENT)
Dept: FAMILY MEDICINE | Facility: CLINIC | Age: 57
End: 2022-10-13
Payer: COMMERCIAL

## 2022-10-13 NOTE — TELEPHONE ENCOUNTER
----- Message from Marlee Hanna sent at 10/13/2022 10:37 AM CDT -----  Regarding: labs  .Type:  Test Results    Who Called:  Cathie   Name of Test (Lab/Mammo/Etc): lab  Date of Test:   Ordering Provider:   Where the test was performed:   Would the patient rather a call back or a response via [facility]? Call back  Best Call Back Number: [phone]  Additional Information:  Pt called and said she needs labs for her appt on 10/18. She wants a call back from the nurse .she wants her order sent to [facility] or [facility].

## 2022-10-14 LAB
ALBUMIN SERPL-MCNC: 4.7 G/DL (ref 3.8–4.9)
ALBUMIN/GLOB SERPL: 1.9 {RATIO} (ref 1.2–2.2)
ALP SERPL-CCNC: 104 IU/L (ref 44–121)
ALT SERPL-CCNC: 12 IU/L (ref 0–32)
AST SERPL-CCNC: 15 IU/L (ref 0–40)
BILIRUB SERPL-MCNC: 0.3 MG/DL (ref 0–1.2)
BUN SERPL-MCNC: 16 MG/DL (ref 6–24)
BUN/CREAT SERPL: 18 (ref 9–23)
CALCIUM SERPL-MCNC: 9.6 MG/DL (ref 8.7–10.2)
CHLORIDE SERPL-SCNC: 101 MMOL/L (ref 96–106)
CO2 SERPL-SCNC: 24 MMOL/L (ref 20–29)
CREAT SERPL-MCNC: 0.91 MG/DL (ref 0.57–1)
EST. GFR  (NO RACE VARIABLE): 74 ML/MIN/1.73
GLOBULIN SER CALC-MCNC: 2.5 G/DL (ref 1.5–4.5)
GLUCOSE SERPL-MCNC: 131 MG/DL (ref 70–99)
HBA1C MFR BLD: 9 % (ref 4.8–5.6)
POTASSIUM SERPL-SCNC: 4.5 MMOL/L (ref 3.5–5.2)
PROT SERPL-MCNC: 7.2 G/DL (ref 6–8.5)
SODIUM SERPL-SCNC: 140 MMOL/L (ref 134–144)
SPECIMEN STATUS REPORT: NORMAL

## 2022-10-18 ENCOUNTER — OFFICE VISIT (OUTPATIENT)
Dept: FAMILY MEDICINE | Facility: CLINIC | Age: 57
End: 2022-10-18
Payer: COMMERCIAL

## 2022-10-18 VITALS
DIASTOLIC BLOOD PRESSURE: 78 MMHG | BODY MASS INDEX: 28.34 KG/M2 | RESPIRATION RATE: 18 BRPM | OXYGEN SATURATION: 98 % | HEIGHT: 62 IN | TEMPERATURE: 98 F | HEART RATE: 74 BPM | WEIGHT: 154 LBS | SYSTOLIC BLOOD PRESSURE: 126 MMHG

## 2022-10-18 DIAGNOSIS — E11.69 HYPERLIPIDEMIA ASSOCIATED WITH TYPE 2 DIABETES MELLITUS: ICD-10-CM

## 2022-10-18 DIAGNOSIS — E78.5 HYPERLIPIDEMIA ASSOCIATED WITH TYPE 2 DIABETES MELLITUS: ICD-10-CM

## 2022-10-18 DIAGNOSIS — E11.65 CONTROLLED TYPE 2 DIABETES MELLITUS WITH HYPERGLYCEMIA, WITHOUT LONG-TERM CURRENT USE OF INSULIN: Primary | ICD-10-CM

## 2022-10-18 PROCEDURE — 1160F PR REVIEW ALL MEDS BY PRESCRIBER/CLIN PHARMACIST DOCUMENTED: ICD-10-PCS | Mod: CPTII,,, | Performed by: FAMILY MEDICINE

## 2022-10-18 PROCEDURE — 3078F PR MOST RECENT DIASTOLIC BLOOD PRESSURE < 80 MM HG: ICD-10-PCS | Mod: CPTII,,, | Performed by: FAMILY MEDICINE

## 2022-10-18 PROCEDURE — 3078F DIAST BP <80 MM HG: CPT | Mod: CPTII,,, | Performed by: FAMILY MEDICINE

## 2022-10-18 PROCEDURE — 1160F RVW MEDS BY RX/DR IN RCRD: CPT | Mod: CPTII,,, | Performed by: FAMILY MEDICINE

## 2022-10-18 PROCEDURE — 3074F PR MOST RECENT SYSTOLIC BLOOD PRESSURE < 130 MM HG: ICD-10-PCS | Mod: CPTII,,, | Performed by: FAMILY MEDICINE

## 2022-10-18 PROCEDURE — 99214 OFFICE O/P EST MOD 30 MIN: CPT | Mod: ,,, | Performed by: FAMILY MEDICINE

## 2022-10-18 PROCEDURE — 1159F MED LIST DOCD IN RCRD: CPT | Mod: CPTII,,, | Performed by: FAMILY MEDICINE

## 2022-10-18 PROCEDURE — 1159F PR MEDICATION LIST DOCUMENTED IN MEDICAL RECORD: ICD-10-PCS | Mod: CPTII,,, | Performed by: FAMILY MEDICINE

## 2022-10-18 PROCEDURE — 99214 PR OFFICE/OUTPT VISIT, EST, LEVL IV, 30-39 MIN: ICD-10-PCS | Mod: ,,, | Performed by: FAMILY MEDICINE

## 2022-10-18 PROCEDURE — 3074F SYST BP LT 130 MM HG: CPT | Mod: CPTII,,, | Performed by: FAMILY MEDICINE

## 2022-10-18 PROCEDURE — 3052F PR MOST RECENT HEMOGLOBIN A1C LEVEL 8.0 - < 9.0%: ICD-10-PCS | Mod: CPTII,,, | Performed by: FAMILY MEDICINE

## 2022-10-18 PROCEDURE — 3052F HG A1C>EQUAL 8.0%<EQUAL 9.0%: CPT | Mod: CPTII,,, | Performed by: FAMILY MEDICINE

## 2022-10-18 PROCEDURE — 4010F ACE/ARB THERAPY RXD/TAKEN: CPT | Mod: CPTII,,, | Performed by: FAMILY MEDICINE

## 2022-10-18 PROCEDURE — 4010F PR ACE/ARB THEARPY RXD/TAKEN: ICD-10-PCS | Mod: CPTII,,, | Performed by: FAMILY MEDICINE

## 2022-10-18 RX ORDER — METFORMIN HYDROCHLORIDE 500 MG/1
500 TABLET ORAL 2 TIMES DAILY WITH MEALS
COMMUNITY
End: 2022-12-07 | Stop reason: SDUPTHER

## 2022-10-18 NOTE — PROGRESS NOTES
Patient ID: [id number]     Chief Complaint: Diabetes        HPI:     Cathie Santos is a 56 y.o. female here today for followup DM II.   - Here for followup DM II. DM II is stable and asymptomatic, but she reports that she is only taking Metformin and Glipizide without side effects, her fasting CBGs have been 100's, she admits that she has been non-compliant with diet, but plans to get back on track. She had labs done on 10/13/2022, here to discuss lab results. She is UTD on DM II eye exam. She is allergic to eggs and cannot do flu vaccine.   -  Here for followup hypercholesterolemia. She is compliant with diet and Rx as prescribed, no side effects. She will need order for cholesterol because her cholesterol labs weren't done and they were due.   - Patient is without any other complaints today.     ----------------------------  Anemia, unspecified  HTN (hypertension)  Hypercholesterolemia  RUFINO (obstructive sleep apnea)  Type 2 diabetes mellitus without complications  Vitamin D deficiency     Past Surgical History:   Procedure Laterality Date    CHOLECYSTECTOMY         Review of patient's allergies indicates:  No Known Allergies    Outpatient Medications Marked as Taking for the 10/18/22 encounter (Office Visit) with Stacey Hendrickson MD   Medication Sig Dispense Refill    aspirin (ECOTRIN) 81 MG EC tablet Take 81 mg by mouth once daily.      benazepril-hydrochlorthiazide (LOTENSIN HCT) 20-25 mg Tab Take 1 tablet by mouth once daily.      ergocalciferol (ERGOCALCIFEROL) 50,000 unit Cap Take 50,000 Int'l Units by mouth every 7 days.      ferrous sulfate 325 (65 FE) MG EC tablet Take 325 mg by mouth 3 (three) times daily with meals.      glipiZIDE (GLUCOTROL) 5 MG TR24 Take 1 tablet (5 mg total) by mouth daily with breakfast. 90 tablet 3    meloxicam (MOBIC) 15 MG tablet Take 1 tablet (15 mg total) by mouth daily as needed for Pain. 90 tablet 3    metFORMIN (GLUCOPHAGE) 500 MG tablet Take 500 mg by mouth 2 (two)  times daily with meals.      simvastatin (ZOCOR) 80 MG tablet Take 80 mg by mouth once daily.      [DISCONTINUED] empagliflozin (JARDIANCE) 25 mg tablet Take 1 tablet (25 mg total) by mouth once daily. 30 tablet 3    [DISCONTINUED] linaGLIPtin (TRADJENTA) 5 mg Tab tablet Take 1 tablet (5 mg total) by mouth once daily. 30 tablet 3    [DISCONTINUED] metFORMIN (GLUMETZA) 1000 MG (MOD) 24hr tablet Take 1 tablet (1,000 mg total) by mouth daily with breakfast. 90 tablet 3       Social History     Socioeconomic History    Marital status: Unknown   Tobacco Use    Smoking status: Never    Smokeless tobacco: Never   Substance and Sexual Activity    Alcohol use: Yes    Drug use: Never    Sexual activity: Yes        Family History   Problem Relation Age of Onset    Diabetes Mother     Diabetes Sister     Diabetes Brother         Subjective:       Review of Systems:    See HPI for details    Constitutional: Denies Change in appetite. Denies Chills. Denies Fever. Denies Night sweats.  Eye: Denies Blurred vision. Denies Discharge. Denies Eye pain.  ENT: Denies Decreased hearing. Denies Sore throat. Denies Swollen glands.  Respiratory: Denies Cough. Denies Shortness of breath. Denies Shortness of breath with exertion. Denies Wheezing.  Cardiovascular: Denies Chest pain at rest. Denies Chest pain with exertion. Denies Irregular heartbeat. Denies Palpitations.  Gastrointestinal: Denies Abdominal pain. Denies Diarrhea. Denies Nausea. Denies Vomiting. Denies Hematemesis or Hematochezia.  Genitourinary: Denies Dysuria. Denies Urinary frequency. Denies Urinary urgency. Denies Blood in urine.  Endocrine: Denies Cold intolerance. Denies Excessive thirst. Denies Heat intolerance. Denies Weight loss. Denies Weight gain.  Musculoskeletal: Denies Painful joints. Denies Weakness.  Integumentary: Denies Rash. Denies Itching. Denies Dry skin.  Neurologic: Denies Dizziness. Denies Fainting. Denies Headache.  Psychiatric: Denies Depression. Denies  "Anxiety. Denies Suicidal/Homicidal ideations.    All Other ROS: Negative except as stated in HPI.       Objective:     /78 (BP Location: Left arm, Patient Position: Sitting, BP Method: Medium (Manual))   Pulse 74   Temp 98.2 °F (36.8 °C) (Oral)   Resp 18   Ht 5' 2" (1.575 m)   Wt 69.9 kg (154 lb)   SpO2 98%   BMI 28.17 kg/m²     Physical Exam    General: Alert and oriented, No acute distress. Overweight.   Head: Normocephalic, Atraumatic.  Eye: Pupils are equal, round and reactive to light, Extraocular movements are intact, Sclera non-icteric.  Ears/Nose/Throat: Normal, Mucosa moist,Clear.  Neck/Thyroid: Supple, Non-tender, No carotid bruit, No palpable thyromegaly or thyroid nodule, No lymphadenopathy, No JVD, Full range of motion.  Respiratory: Clear to auscultation bilaterally; No wheezes, rales or rhonchi,Non-labored respirations, Symmetrical chest wall expansion.  Cardiovascular: Regular rate and rhythm, S1/S2 normal, No murmurs, rubs or gallops.  Gastrointestinal: Soft, Non-tender, Non-distended, Normal bowel sounds, No palpable organomegaly.  Musculoskeletal: Normal range of motion.  Protective Sensation (w/ 10 gram monofilament):  Right: Intact  Left: Intact    Visual Inspection:  Normal -  Bilateral    Pedal Pulses:   Right: Present  Left: Present    Posterior tibialis:   Right:Present  Left: Present     Integumentary: Warm, Dry, Intact, No suspicious lesions or rashes.  Extremities: No clubbing, cyanosis or edema  Neurologic: No focal deficits, Cranial Nerves II-XII are grossly intact, Motor strength normal upper and lower extremities, Sensory exam intact.  Psychiatric: Normal interaction, Coherent speech, Euthymic mood, Appropriate affect         Assessment:       ICD-10-CM ICD-9-CM   1. Controlled type 2 diabetes mellitus with hyperglycemia, without long-term current use of insulin  E11.65 250.80     790.29   2. Hyperlipidemia associated with type 2 diabetes mellitus  E11.69 250.80    E78.5 " 272.4        Plan:     Problem List Items Addressed This Visit          Cardiac/Vascular    Hyperlipidemia associated with type 2 diabetes mellitus    Relevant Medications    metFORMIN (GLUCOPHAGE) 500 MG tablet    Other Relevant Orders    CK    Lipid Panel       Endocrine    Controlled type 2 diabetes mellitus with hyperglycemia, without long-term current use of insulin - Primary    Relevant Medications    metFORMIN (GLUCOPHAGE) 500 MG tablet    empagliflozin (JARDIANCE) 25 mg tablet    Other Relevant Orders    Comprehensive Metabolic Panel    Hemoglobin A1C    Microalbumin/Creatinine Ratio, Urine    1. Controlled type 2 diabetes mellitus with hyperglycemia, without long-term current use of insulin  - empagliflozin (JARDIANCE) 25 mg tablet; Take 1 tablet (25 mg total) by mouth once daily. 2 month sample supply given to patient  Dispense: 60 tablet; Refill: 0  - Comprehensive Metabolic Panel; Future in 12/2022.   - Hemoglobin A1C; Future in 12/2022.  - Microalbumin/Creatinine Ratio, Urine; Future  - DM II is not controlled, patient given Rx samples for Jardiance 25mg po x qday to resume Jardiance since her insurance will not cover that Rx. Patient to continue Metformin and Glipizide as prescribed for now. Keep daily fasting CBG log. Keep appointment for annual eye exam as scheduled. Notify M.D. or ER if CBG >400 or <60, UTI, vaginal yeast infection, polyuria, polydipsia, or polyphagia.     Lab Results   Component Value Date    HGBA1C 9.0 (H) 10/13/2022      Continue   On ACE and Statin according to guidelines.  Follow ADA Diet. Avoid soda, simple sweets, and limit rice/pasta/breads/starches.  Maintain healthy weight with goal BMI <30.  Exercise 5 times per week for 30 minutes per day.  Stressed importance of daily foot exams.  Stressed importance of annual dilated eye exam.     2. Hyperlipidemia associated with type 2 diabetes mellitus  - CK; Future  - Lipid Panel; Future  - Cholesterol was well controlled, LDL:  87,NL<100, needs low cholesterol diet, exercise, and cholesterol Rx as prescribed, recheck FLP, CMP next available, was supposed be done with last labs, but wasn't done by lab.  Continue  Stressed importance of dietary modifications. Follow a low cholesterol, low saturated fat diet with less that 200mg of cholesterol a day.  Avoid fried foods and high saturated fats (high saturated fats less than 7% of calories).  Add Flax Seed/Fish Oil supplements to diet. Increase dietary fiber.  Regular exercise can reduce LDL and raise HDL. Stressed importance of physical activity 5 times per week for 30 minutes per day.       Cathie was seen today for diabetes.    Diagnoses and all orders for this visit:    Controlled type 2 diabetes mellitus with hyperglycemia, without long-term current use of insulin  -     empagliflozin (JARDIANCE) 25 mg tablet; Take 1 tablet (25 mg total) by mouth once daily. 2 month sample supply given to patient  -     Comprehensive Metabolic Panel; Future  -     Hemoglobin A1C; Future  -     Microalbumin/Creatinine Ratio, Urine; Future    Hyperlipidemia associated with type 2 diabetes mellitus  -     CK; Future  -     Lipid Panel; Future        Medication List with Changes/Refills   New Medications    EMPAGLIFLOZIN (JARDIANCE) 25 MG TABLET    Take 1 tablet (25 mg total) by mouth once daily. 2 month sample supply given to patient       Start Date: 10/18/2022End Date: 12/17/2022   Current Medications    ASPIRIN (ECOTRIN) 81 MG EC TABLET    Take 81 mg by mouth once daily.       Start Date: --        End Date: --    BENAZEPRIL-HYDROCHLORTHIAZIDE (LOTENSIN HCT) 20-25 MG TAB    Take 1 tablet by mouth once daily.       Start Date: 1/5/2022  End Date: --    ERGOCALCIFEROL (ERGOCALCIFEROL) 50,000 UNIT CAP    Take 50,000 Int'l Units by mouth every 7 days.       Start Date: 2/1/2022  End Date: --    FERROUS SULFATE 325 (65 FE) MG EC TABLET    Take 325 mg by mouth 3 (three) times daily with meals.       Start Date:  --        End Date: --    GLIPIZIDE (GLUCOTROL) 5 MG TR24    Take 1 tablet (5 mg total) by mouth daily with breakfast.       Start Date: 7/19/2022 End Date: 7/19/2023    MELOXICAM (MOBIC) 15 MG TABLET    Take 1 tablet (15 mg total) by mouth daily as needed for Pain.       Start Date: 7/6/2022  End Date: 7/6/2023    METFORMIN (GLUCOPHAGE) 500 MG TABLET    Take 500 mg by mouth 2 (two) times daily with meals.       Start Date: --        End Date: --    SIMVASTATIN (ZOCOR) 80 MG TABLET    Take 80 mg by mouth once daily.       Start Date: 1/5/2022  End Date: --   Discontinued Medications    EMPAGLIFLOZIN (JARDIANCE) 25 MG TABLET    Take 1 tablet (25 mg total) by mouth once daily.       Start Date: 7/18/2022 End Date: 10/18/2022    LINAGLIPTIN (TRADJENTA) 5 MG TAB TABLET    Take 1 tablet (5 mg total) by mouth once daily.       Start Date: 7/18/2022 End Date: 10/18/2022    METFORMIN (GLUMETZA) 1000 MG (MOD) 24HR TABLET    Take 1 tablet (1,000 mg total) by mouth daily with breakfast.       Start Date: 7/19/2022 End Date: 10/18/2022          Follow up in about 2 months (around 12/18/2022) for Diabetes Followup- 30 minute appointment.

## 2022-11-03 ENCOUNTER — TELEPHONE (OUTPATIENT)
Dept: FAMILY MEDICINE | Facility: CLINIC | Age: 57
End: 2022-11-03
Payer: COMMERCIAL

## 2022-11-03 DIAGNOSIS — B37.31 VAGINAL YEAST INFECTION: Primary | ICD-10-CM

## 2022-11-03 RX ORDER — FLUCONAZOLE 150 MG/1
150 TABLET ORAL DAILY
Qty: 1 TABLET | Refills: 0 | Status: SHIPPED | OUTPATIENT
Start: 2022-11-03 | End: 2022-11-04

## 2022-11-03 NOTE — TELEPHONE ENCOUNTER
Spoke to patient, stated she stopped taking it due to feeling weak, cbg were low and felt drained, started with rash on arms     Pt stated she has a yeast infection now, can something be called in

## 2022-11-03 NOTE — TELEPHONE ENCOUNTER
----- Message from Ary Canas sent at 11/3/2022  3:29 PM CDT -----  Regarding: advice  Type:  Needs Medical Advice    Who Called: pt  Symptoms (please be specific): reaction to meds   How long has patient had these symptoms:  4 days  Pharmacy name and phone #:  [facility] [place]  Would the patient rather a call back or a response via [facility]?   Best Call Back Number: [phone]  Additional Information:pt is taking empagliflozin (JARDIANCE) 25 mg tablet and she stated that she had a reaction to it and was asking to speak with a nurse about it.

## 2022-11-09 ENCOUNTER — TELEPHONE (OUTPATIENT)
Dept: FAMILY MEDICINE | Facility: CLINIC | Age: 57
End: 2022-11-09
Payer: COMMERCIAL

## 2022-11-09 DIAGNOSIS — M25.511 BILATERAL SHOULDER PAIN, UNSPECIFIED CHRONICITY: Primary | ICD-10-CM

## 2022-11-09 DIAGNOSIS — M25.512 BILATERAL SHOULDER PAIN, UNSPECIFIED CHRONICITY: Primary | ICD-10-CM

## 2022-11-09 NOTE — TELEPHONE ENCOUNTER
----- Message from Vitor Shanks sent at 11/9/2022  4:25 PM CST -----  .Type:  Needs Medical Advice    Who Called: pt  Would the patient rather a call back or a response via [facility]? Call back  Best Call Back Number: [phone]  Additional Information: pt is requesting order clarification on an Xray order

## 2022-11-09 NOTE — TELEPHONE ENCOUNTER
----- Message from Chris Moreira MA sent at 11/9/2022  4:50 PM CST -----  Both arms     ----- Message -----  From: Stacey Hendrickson MD  Sent: 11/9/2022   4:43 PM CST  To: Chris Moreira MA    Which arm? Please advise. Thanks.   ----- Message -----  From: Chris Moreira MA  Sent: 11/9/2022   4:42 PM CST  To: Stacey Hendrickson MD    Pt needs order for arm x-ray, [facility] says there is only an order for the neck      ----- Message -----  From: Vitor Shanks  Sent: 11/9/2022   4:27 PM CST  To: Madhav[facility] FIORE Staff    .Type:  Needs Medical Advice    Who Called: pt  Would the patient rather a call back or a response via [facility]? Call back  Best Call Back Number: [phone]  Additional Information: pt is requesting order clarification on an Xray order

## 2022-11-10 ENCOUNTER — TELEPHONE (OUTPATIENT)
Dept: FAMILY MEDICINE | Facility: CLINIC | Age: 57
End: 2022-11-10
Payer: COMMERCIAL

## 2022-11-10 DIAGNOSIS — M79.602 PAIN IN BOTH UPPER EXTREMITIES: Primary | ICD-10-CM

## 2022-11-10 DIAGNOSIS — M79.601 PAIN IN BOTH UPPER EXTREMITIES: Primary | ICD-10-CM

## 2022-11-10 NOTE — TELEPHONE ENCOUNTER
----- Message from Vitor Shanks sent at 11/10/2022  8:50 AM CST -----  .Type:  Needs Medical Advice    Who Called: pt  Would the patient rather a call back or a response via [facility]? pt  Best Call Back Number: [phone]  Additional Information: Complete arm Xray on both along with full shoulder down whole arm. Pt wanted to inform and correct information for xrays

## 2022-11-10 NOTE — TELEPHONE ENCOUNTER
----- Message from Chris Moreira MA sent at 11/10/2022  9:13 AM CST -----  Pt gave call back and is now asking for whole arm xrays on both arms    ----- Message -----  From: Stacey Hendrickson MD  Sent: 11/10/2022   9:02 AM CST  To: Chris Moreira MA    XR placed for both shoulders. Thanks.   ----- Message -----  From: Chris Moreira MA  Sent: 11/10/2022   8:41 AM CST  To: Stacey Hendrickson MD    Shoulder to elbow on both arms   ----- Message -----  From: Stacey Hendrickson MD  Sent: 11/9/2022   5:03 PM CST  To: Chris Moreira MA    Shoulders or forearms?   ----- Message -----  From: Chris Moreira MA  Sent: 11/9/2022   4:54 PM CST  To: Stacey Hendrickson MD    Both arms     ----- Message -----  From: Stacey Hendrickson MD  Sent: 11/9/2022   4:43 PM CST  To: Chris Moreira MA    Which arm? Please advise. Thanks.   ----- Message -----  From: Chris Moreira MA  Sent: 11/9/2022   4:42 PM CST  To: Stacey Hendrickson MD    Pt needs order for arm x-ray, [facility] says there is only an order for the neck      ----- Message -----  From: Vitor Shanks  Sent: 11/9/2022   4:27 PM CST  To: Madhavi Mendoza    .Type:  Needs Medical Advice    Who Called: pt  Would the patient rather a call back or a response via [facility]? Call back  Best Call Back Number: [phone]  Additional Information: pt is requesting order clarification on an Xray order

## 2022-11-10 NOTE — TELEPHONE ENCOUNTER
----- Message from Stacey Hendrickson MD sent at 11/10/2022 12:28 PM CST -----  Xrays ordered as requested. Thanks.   ----- Message -----  From: Chris Moreira MA  Sent: 11/10/2022   9:14 AM CST  To: Stacey Hendrickson MD    Pt gave call back and is now asking for whole arm xrays on both arms    ----- Message -----  From: Stacey Hendrickson MD  Sent: 11/10/2022   9:02 AM CST  To: Chris Moreira MA    XR placed for both shoulders. Thanks.   ----- Message -----  From: Chris Moreira MA  Sent: 11/10/2022   8:41 AM CST  To: Stacey Hendrickson MD    Shoulder to elbow on both arms   ----- Message -----  From: Stacey Hendrickson MD  Sent: 11/9/2022   5:03 PM CST  To: Chris Moreira MA    Shoulders or forearms?   ----- Message -----  From: Chris Moreira MA  Sent: 11/9/2022   4:54 PM CST  To: Stacey Hendrickson MD    Both arms     ----- Message -----  From: Stacey Hendrickson MD  Sent: 11/9/2022   4:43 PM CST  To: Chris Moreira MA    Which arm? Please advise. Thanks.   ----- Message -----  From: Chris Moreira MA  Sent: 11/9/2022   4:42 PM CST  To: Stacey Hendrickson MD    Pt needs order for arm x-ray, [facility] says there is only an order for the neck      ----- Message -----  From: Vitor Shanks  Sent: 11/9/2022   4:27 PM CST  To: Madhavi Mendoza    .Type:  Needs Medical Advice    Who Called: pt  Would the patient rather a call back or a response via [facility]? Call back  Best Call Back Number: [phone]  Additional Information: pt is requesting order clarification on an Xray order

## 2022-11-16 ENCOUNTER — TELEPHONE (OUTPATIENT)
Dept: FAMILY MEDICINE | Facility: CLINIC | Age: 57
End: 2022-11-16
Payer: COMMERCIAL

## 2022-11-16 DIAGNOSIS — M50.30 DEGENERATIVE DISC DISEASE, CERVICAL: Primary | ICD-10-CM

## 2022-11-16 DIAGNOSIS — S40.852A FOREIGN BODY OF LEFT UPPER ARM: Primary | ICD-10-CM

## 2022-11-16 NOTE — TELEPHONE ENCOUNTER
----- Message from Stacey Hendrickson MD sent at 11/16/2022  1:08 PM CST -----  XR left humerus shows mild arthritis changes in her shoulder and a 2mm metallic foreign body in the soft tissue of her lower arm, referral to orthopedic specialist for evaluation and treatment is in file.

## 2022-11-16 NOTE — TELEPHONE ENCOUNTER
----- Message from Stacey Hendrickson MD sent at 11/16/2022  1:13 PM CST -----  XR bilateral wrists are normal.

## 2022-11-16 NOTE — TELEPHONE ENCOUNTER
----- Message from Stacey Hendrickson MD sent at 11/16/2022  1:10 PM CST -----  XR bilateral elbows shows small metallic foreign body in left arm, has Ortho referral in file.

## 2022-11-16 NOTE — TELEPHONE ENCOUNTER
----- Message from Stacey Hendrickson MD sent at 11/16/2022  1:09 PM CST -----  XR right humerus shows mild arthritis changes, has ortho referral in file.

## 2022-11-16 NOTE — TELEPHONE ENCOUNTER
----- Message from Gordo Duarte sent at 11/16/2022  2:52 PM CST -----  .Type:  Needs Medical Advice    Who Called: Cathie   Symptoms (please be specific):    How long has patient had these symptoms:    Pharmacy name and phone #:    Would the patient rather a call back or a response via [facility]?   Best Call Back Number: [phone]  Additional Information: She was returning a call she got about ten minutes ago. I tried the nurse line five time and it was busy, please give her a call back.  She is concerned about her desk results. Pleased give her a call back.

## 2022-11-28 ENCOUNTER — DOCUMENTATION ONLY (OUTPATIENT)
Dept: ADMINISTRATIVE | Facility: HOSPITAL | Age: 57
End: 2022-11-28
Payer: COMMERCIAL

## 2022-12-03 LAB
ALBUMIN SERPL-MCNC: 4.6 G/DL (ref 3.8–4.9)
ALBUMIN/GLOB SERPL: 1.6 {RATIO} (ref 1.2–2.2)
ALP SERPL-CCNC: 105 IU/L (ref 44–121)
ALT SERPL-CCNC: 15 IU/L (ref 0–32)
AST SERPL-CCNC: 18 IU/L (ref 0–40)
BILIRUB SERPL-MCNC: 0.4 MG/DL (ref 0–1.2)
BUN SERPL-MCNC: 21 MG/DL (ref 6–24)
BUN/CREAT SERPL: 21 (ref 9–23)
CALCIUM SERPL-MCNC: 9.3 MG/DL (ref 8.7–10.2)
CHLORIDE SERPL-SCNC: 102 MMOL/L (ref 96–106)
CHOLEST SERPL-MCNC: 173 MG/DL (ref 100–199)
CK SERPL-CCNC: 163 U/L (ref 32–182)
CO2 SERPL-SCNC: 25 MMOL/L (ref 20–29)
CREAT SERPL-MCNC: 1.02 MG/DL (ref 0.57–1)
EST. GFR  (NO RACE VARIABLE): 64 ML/MIN/1.73
GLOBULIN SER CALC-MCNC: 2.8 G/DL (ref 1.5–4.5)
GLUCOSE SERPL-MCNC: 122 MG/DL (ref 70–99)
HBA1C MFR BLD: 8.5 % (ref 4.8–5.6)
HDLC SERPL-MCNC: 81 MG/DL
LDLC SERPL CALC-MCNC: 83 MG/DL (ref 0–99)
POTASSIUM SERPL-SCNC: 4.6 MMOL/L (ref 3.5–5.2)
PROT SERPL-MCNC: 7.4 G/DL (ref 6–8.5)
SODIUM SERPL-SCNC: 139 MMOL/L (ref 134–144)
SPECIMEN STATUS REPORT: NORMAL
TRIGL SERPL-MCNC: 41 MG/DL (ref 0–149)
VLDLC SERPL CALC-MCNC: 9 MG/DL (ref 5–40)

## 2022-12-05 ENCOUNTER — TELEPHONE (OUTPATIENT)
Dept: FAMILY MEDICINE | Facility: CLINIC | Age: 57
End: 2022-12-05
Payer: COMMERCIAL

## 2022-12-05 NOTE — TELEPHONE ENCOUNTER
----- Message from Stacey Hendrickson MD sent at 12/5/2022  9:48 AM CST -----  Labs were reviewed, will discuss results with patient at scheduled office visit on 12/07/2022. Thanks.

## 2022-12-06 DIAGNOSIS — M54.16 BILATERAL LUMBAR RADICULOPATHY: ICD-10-CM

## 2022-12-07 ENCOUNTER — OFFICE VISIT (OUTPATIENT)
Dept: FAMILY MEDICINE | Facility: CLINIC | Age: 57
End: 2022-12-07
Payer: COMMERCIAL

## 2022-12-07 VITALS
WEIGHT: 159 LBS | HEART RATE: 63 BPM | HEIGHT: 63 IN | SYSTOLIC BLOOD PRESSURE: 130 MMHG | DIASTOLIC BLOOD PRESSURE: 71 MMHG | RESPIRATION RATE: 18 BRPM | OXYGEN SATURATION: 99 % | BODY MASS INDEX: 28.17 KG/M2 | TEMPERATURE: 99 F

## 2022-12-07 DIAGNOSIS — S40.852A FOREIGN BODY OF LEFT UPPER ARM: ICD-10-CM

## 2022-12-07 DIAGNOSIS — E11.69 HYPERLIPIDEMIA ASSOCIATED WITH TYPE 2 DIABETES MELLITUS: ICD-10-CM

## 2022-12-07 DIAGNOSIS — E78.5 HYPERLIPIDEMIA ASSOCIATED WITH TYPE 2 DIABETES MELLITUS: ICD-10-CM

## 2022-12-07 DIAGNOSIS — E11.65 CONTROLLED TYPE 2 DIABETES MELLITUS WITH HYPERGLYCEMIA, WITHOUT LONG-TERM CURRENT USE OF INSULIN: ICD-10-CM

## 2022-12-07 DIAGNOSIS — M50.30 DEGENERATIVE DISC DISEASE, CERVICAL: Primary | ICD-10-CM

## 2022-12-07 PROCEDURE — 1159F MED LIST DOCD IN RCRD: CPT | Mod: CPTII,,, | Performed by: FAMILY MEDICINE

## 2022-12-07 PROCEDURE — 3078F PR MOST RECENT DIASTOLIC BLOOD PRESSURE < 80 MM HG: ICD-10-PCS | Mod: CPTII,,, | Performed by: FAMILY MEDICINE

## 2022-12-07 PROCEDURE — 99214 OFFICE O/P EST MOD 30 MIN: CPT | Mod: ,,, | Performed by: FAMILY MEDICINE

## 2022-12-07 PROCEDURE — 3008F PR BODY MASS INDEX (BMI) DOCUMENTED: ICD-10-PCS | Mod: CPTII,,, | Performed by: FAMILY MEDICINE

## 2022-12-07 PROCEDURE — 3052F HG A1C>EQUAL 8.0%<EQUAL 9.0%: CPT | Mod: CPTII,,, | Performed by: FAMILY MEDICINE

## 2022-12-07 PROCEDURE — 3078F DIAST BP <80 MM HG: CPT | Mod: CPTII,,, | Performed by: FAMILY MEDICINE

## 2022-12-07 PROCEDURE — 1160F PR REVIEW ALL MEDS BY PRESCRIBER/CLIN PHARMACIST DOCUMENTED: ICD-10-PCS | Mod: CPTII,,, | Performed by: FAMILY MEDICINE

## 2022-12-07 PROCEDURE — 3008F BODY MASS INDEX DOCD: CPT | Mod: CPTII,,, | Performed by: FAMILY MEDICINE

## 2022-12-07 PROCEDURE — 99214 PR OFFICE/OUTPT VISIT, EST, LEVL IV, 30-39 MIN: ICD-10-PCS | Mod: ,,, | Performed by: FAMILY MEDICINE

## 2022-12-07 PROCEDURE — 3052F PR MOST RECENT HEMOGLOBIN A1C LEVEL 8.0 - < 9.0%: ICD-10-PCS | Mod: CPTII,,, | Performed by: FAMILY MEDICINE

## 2022-12-07 PROCEDURE — 1159F PR MEDICATION LIST DOCUMENTED IN MEDICAL RECORD: ICD-10-PCS | Mod: CPTII,,, | Performed by: FAMILY MEDICINE

## 2022-12-07 PROCEDURE — 3075F PR MOST RECENT SYSTOLIC BLOOD PRESS GE 130-139MM HG: ICD-10-PCS | Mod: CPTII,,, | Performed by: FAMILY MEDICINE

## 2022-12-07 PROCEDURE — 1160F RVW MEDS BY RX/DR IN RCRD: CPT | Mod: CPTII,,, | Performed by: FAMILY MEDICINE

## 2022-12-07 PROCEDURE — 4010F PR ACE/ARB THEARPY RXD/TAKEN: ICD-10-PCS | Mod: CPTII,,, | Performed by: FAMILY MEDICINE

## 2022-12-07 PROCEDURE — 4010F ACE/ARB THERAPY RXD/TAKEN: CPT | Mod: CPTII,,, | Performed by: FAMILY MEDICINE

## 2022-12-07 PROCEDURE — 3075F SYST BP GE 130 - 139MM HG: CPT | Mod: CPTII,,, | Performed by: FAMILY MEDICINE

## 2022-12-07 RX ORDER — TRAMADOL HYDROCHLORIDE 50 MG/1
50 TABLET ORAL EVERY 12 HOURS PRN
Qty: 60 TABLET | Refills: 0 | Status: SHIPPED | OUTPATIENT
Start: 2022-12-07 | End: 2023-05-16

## 2022-12-07 RX ORDER — METFORMIN HYDROCHLORIDE 500 MG/1
500 TABLET ORAL 2 TIMES DAILY WITH MEALS
Qty: 180 TABLET | Refills: 3 | Status: SHIPPED | OUTPATIENT
Start: 2022-12-07 | End: 2023-01-04

## 2022-12-07 RX ORDER — TRAMADOL HYDROCHLORIDE 50 MG/1
TABLET ORAL
Qty: 60 TABLET | Refills: 1 | OUTPATIENT
Start: 2022-12-07

## 2022-12-07 NOTE — TELEPHONE ENCOUNTER
Notified pharm to keep medication as it previously was on metformin Er 500 mg Bid       ----- Message from Stacey Hendrickson MD sent at 12/7/2022 10:05 AM CST -----  I refilled what she had on her chart, but I want her to take whichever one she had been taking. Please send refill request for the correct Rx. Thanks.   ----- Message -----  From: Antonio Jones LPN  Sent: 12/7/2022   9:49 AM CST  To: Stacey Hendrickson MD    She was on the Metformin  MG BID you want to keep that or go to the metformin 500 BID regular tab  ----- Message -----  From: Gordo Duarte  Sent: 12/7/2022   9:43 AM CST  To: Madhavi FIORE Staff    .Type:  Needs Medical Advice    Who Called: [facility] in [facility],    Symptoms (please be specific):    How long has patient had these symptoms:    Pharmacy name and phone #:    Would the patient rather a call back or a response via [facility]?   Best Call Back Number: [phone]  Additional Information: He will need a call back from the nurse re: a script just sent over.

## 2022-12-07 NOTE — PROGRESS NOTES
Patient ID: [id number]     Chief Complaint: Arm Pain        HPI:     Cathie Santos is a 56 y.o. female here today for bilateral upper arm pain x several months.   - She reports that pain is aching, sometimes with numbness, worse with activity, constant, fluctuates in intensity. She denies injury, but she does report that she  35 bottle pack of water when needed. She reports pain in her neck as well and radiates to both of her shoulders. Pain is 5-7/10 on pain scale. She reports that she drops items sometimes because her hand gets weak. She is right hand dominant, but the left is worse than the right. She reports some improvement with OTC Tylenol, Tramadol and Meloxicam as directed, she needs a refill of Tramadol today. She had XR C-spine done on 08/03/2022, that showed moderate DJD C-spine, MRI C-spine was ordered, but wasn't done, needs MRI C-spine ordered.  - She also had XR bilateral arms and shoulders that showed 2mm metallic foreign body in distal left arm, she was referred to Ortho, but missed her appointment, she needs new referral.   - Here for followup DM II, asymptomatic, doing well with Rx, no side effects, awaiting her  to get new insurance in the next month so she can switch back to Synjardy. Needs refill of Metformin today. She had labs done on 12/02/2022, here to discuss the results today.  - Here for followup HLD, asymptomatic, doing well with Rx, no side effects. She had labs done on 12/02/2022, here to discuss the results today.   - Patient is without any other complaints today         ----------------------------  Anemia, unspecified  HTN (hypertension)  Hypercholesterolemia  RUFINO (obstructive sleep apnea)  Type 2 diabetes mellitus without complications  Vitamin D deficiency     Past Surgical History:   Procedure Laterality Date    CHOLECYSTECTOMY         Review of patient's allergies indicates:  No Known Allergies    Outpatient Medications Marked as Taking for the 12/7/22  encounter (Office Visit) with Stacey Hendrickson MD   Medication Sig Dispense Refill    aspirin (ECOTRIN) 81 MG EC tablet Take 81 mg by mouth once daily.      benazepril-hydrochlorthiazide (LOTENSIN HCT) 20-25 mg Tab Take 1 tablet by mouth once daily.      ergocalciferol (ERGOCALCIFEROL) 50,000 unit Cap Take 50,000 Int'l Units by mouth every 7 days.      ferrous sulfate 325 (65 FE) MG EC tablet Take 325 mg by mouth 3 (three) times daily with meals.      glipiZIDE (GLUCOTROL) 5 MG TR24 Take 1 tablet (5 mg total) by mouth daily with breakfast. 90 tablet 3    meloxicam (MOBIC) 15 MG tablet Take 1 tablet (15 mg total) by mouth daily as needed for Pain. 90 tablet 3    simvastatin (ZOCOR) 80 MG tablet Take 80 mg by mouth once daily.      [DISCONTINUED] empagliflozin (JARDIANCE) 25 mg tablet Take 1 tablet (25 mg total) by mouth once daily. 2 month sample supply given to patient 60 tablet 0    [DISCONTINUED] metFORMIN (GLUCOPHAGE) 500 MG tablet Take 500 mg by mouth 2 (two) times daily with meals.         Social History     Socioeconomic History    Marital status: Unknown   Tobacco Use    Smoking status: Never    Smokeless tobacco: Never   Substance and Sexual Activity    Alcohol use: Yes    Drug use: Never    Sexual activity: Yes        Family History   Problem Relation Age of Onset    Diabetes Mother     Diabetes Sister     Diabetes Brother         Subjective:       Review of Systems:    See HPI for details    Constitutional: Denies Change in appetite. Denies Chills. Denies Fever. Denies Night sweats.  Eye: Denies Blurred vision. Denies Discharge. Denies Eye pain.  ENT: Denies Decreased hearing. Denies Sore throat. Denies Swollen glands.  Respiratory: Denies Cough. Denies Shortness of breath. Denies Shortness of breath with exertion. Denies Wheezing.  Cardiovascular: Denies Chest pain at rest. Denies Chest pain with exertion. Denies Irregular heartbeat. Denies Palpitations.  Gastrointestinal: Denies Abdominal pain.  "Denies Diarrhea. Denies Nausea. Denies Vomiting. Denies Hematemesis or Hematochezia.  Genitourinary: Denies Dysuria. Denies Urinary frequency. Denies Urinary urgency. Denies Blood in urine.  Endocrine: Denies Cold intolerance. Denies Excessive thirst. Denies Heat intolerance. Denies Weight loss. Denies Weight gain.  Musculoskeletal: Painful joints. Denies Weakness.  Integumentary: Denies Rash. Denies Itching. Denies Dry skin.  Neurologic: Denies Dizziness. Denies Fainting. Denies Headache.  Psychiatric: Denies Depression. Denies Anxiety. Denies Suicidal/Homicidal ideations.    All Other ROS: Negative except as stated in HPI.       Objective:     /71 (BP Location: Right arm, Patient Position: Sitting, BP Method: Medium (Automatic))   Pulse 63   Temp 98.5 °F (36.9 °C) (Oral)   Resp 18   Ht 5' 3" (1.6 m)   Wt 72.1 kg (159 lb)   SpO2 99%   BMI 28.17 kg/m²     Physical Exam    General: Alert and oriented, No acute distress.  Head: Normocephalic, Atraumatic.  Eye: Pupils are equal, round and reactive to light, Extraocular movements are intact, Sclera non-icteric.  Ears/Nose/Throat: Normal, Mucosa moist,Clear.  Neck/Thyroid: Supple, Non-tender, No carotid bruit, No palpable thyromegaly or thyroid nodule, No lymphadenopathy, No JVD, Full range of motion.  Respiratory: Clear to auscultation bilaterally; No wheezes, rales or rhonchi,Non-labored respirations, Symmetrical chest wall expansion.  Cardiovascular: Regular rate and rhythm, S1/S2 normal, No murmurs, rubs or gallops.  Gastrointestinal: Soft, Non-tender, Non-distended, Normal bowel sounds, No palpable organomegaly.  Musculoskeletal: Normal range of motion. NT, no erythema, no deformity.  Integumentary: Warm, Dry, Intact, No suspicious lesions or rashes.  Extremities: No clubbing, cyanosis or edema  Neurologic: No focal deficits, Cranial Nerves II-XII are grossly intact, Motor strength normal upper and lower extremities, Sensory exam intact.  Psychiatric: " Normal interaction, Coherent speech, Euthymic mood, Appropriate affect     *Lab results from 12/02/2022, XR results from 11/16/2022 were reviewed and discussed with patient and patient voices understanding.*    Assessment:       ICD-10-CM ICD-9-CM   1. Degenerative disc disease, cervical  M50.30 722.4   2. Foreign body of left upper arm  S40.852A 912.6   3. Controlled type 2 diabetes mellitus with hyperglycemia, without long-term current use of insulin  E11.65 250.80     790.29   4. Hyperlipidemia associated with type 2 diabetes mellitus  E11.69 250.80    E78.5 272.4        Plan:     Problem List Items Addressed This Visit          Cardiac/Vascular    Hyperlipidemia associated with type 2 diabetes mellitus    Relevant Medications    metFORMIN (GLUCOPHAGE) 500 MG tablet       Endocrine    Controlled type 2 diabetes mellitus with hyperglycemia, without long-term current use of insulin    Relevant Medications    metFORMIN (GLUCOPHAGE) 500 MG tablet    Other Relevant Orders    Microalbumin/Creatinine Ratio, Urine     Other Visit Diagnoses       Degenerative disc disease, cervical    -  Primary    Relevant Medications    traMADoL (ULTRAM) 50 mg tablet    Other Relevant Orders    MRI Cervical Spine Without Contrast    Foreign body of left upper arm        Relevant Orders    Ambulatory referral/consult to Orthopedics         1. Degenerative disc disease, cervical  - MRI Cervical Spine Without Contrast; Future  - Rx traMADoL (ULTRAM) 50 mg tablet; Take 1 tablet (50 mg total) by mouth every 12 (twelve) hours as needed for Pain.  Dispense: 60 tablet; Refill: 0 refilled today.  - Continue Meloxicam p.r.n. pain/inflammation. Rx Tramadol refilled today, patient to only take Tramadol sparing p.r.n. severe pain only that is not controlled with Meloxicam.  reviewed today. Will referral to pain management verus Neurosurgery pending MRI C-spine result. Stretching exercises encouraged. Notify M.D. or ER if symptoms persist or  worsen, temp >100.4, or any acute illness.     2. Foreign body of left upper arm  - Ambulatory referral/consult to Orthopedics; Future for evaluation and treatment.    3. Controlled type 2 diabetes mellitus with hyperglycemia, without long-term current use of insulin  - Microalbumin/Creatinine Ratio, Urine; Future  - Rx metFORMIN (GLUCOPHAGE) 500 MG tablet; Take 1 tablet (500 mg total) by mouth 2 (two) times daily with meals.  Dispense: 180 tablet; Refill: 3 refilled today.   - DM II is improving. Continue current treatment plan while awaiting insurance coverage to resume Synjardy. Recheck CMP, HgA1C in 03/2023. Keep daily fasting CBG log. Keep appointment for annual eye exam as scheduled. Notify M.D. or ER if CBG >400 or <60, polyuria, polydipsia, or polyphagia.   Lab Results   Component Value Date    HGBA1C 8.5 (H) 12/02/2022      Continue   On ACE and Statin according to guidelines.  Follow ADA Diet. Avoid soda, simple sweets, and limit rice/pasta/breads/starches.  Maintain healthy weight with goal BMI <30.  Exercise 5 times per week for 30 minutes per day.  Stressed importance of daily foot exams.  Stressed importance of annual dilated eye exam.     4. Hyperlipidemia associated with type 2 diabetes mellitus  - Cholesterol is well controlled, LDL: 83, continue current treatment plan, recheck CMP, FLP, CPK in 06/2023.  Continue  Stressed importance of dietary modifications. Follow a low cholesterol, low saturated fat diet with less that 200mg of cholesterol a day.  Avoid fried foods and high saturated fats (high saturated fats less than 7% of calories).  Add Flax Seed/Fish Oil supplements to diet. Increase dietary fiber.  Regular exercise can reduce LDL and raise HDL. Stressed importance of physical activity 5 times per week for 30 minutes per day.      Cathie was seen today for arm pain.    Diagnoses and all orders for this visit:    Degenerative disc disease, cervical  -     MRI Cervical Spine Without Contrast;  Future  -     traMADoL (ULTRAM) 50 mg tablet; Take 1 tablet (50 mg total) by mouth every 12 (twelve) hours as needed for Pain.    Foreign body of left upper arm  -     Ambulatory referral/consult to Orthopedics; Future    Controlled type 2 diabetes mellitus with hyperglycemia, without long-term current use of insulin  -     Microalbumin/Creatinine Ratio, Urine; Future  -     metFORMIN (GLUCOPHAGE) 500 MG tablet; Take 1 tablet (500 mg total) by mouth 2 (two) times daily with meals.    Hyperlipidemia associated with type 2 diabetes mellitus        Medication List with Changes/Refills   New Medications    TRAMADOL (ULTRAM) 50 MG TABLET    Take 1 tablet (50 mg total) by mouth every 12 (twelve) hours as needed for Pain.       Start Date: 12/7/2022 End Date: --   Current Medications    ASPIRIN (ECOTRIN) 81 MG EC TABLET    Take 81 mg by mouth once daily.       Start Date: --        End Date: --    BENAZEPRIL-HYDROCHLORTHIAZIDE (LOTENSIN HCT) 20-25 MG TAB    Take 1 tablet by mouth once daily.       Start Date: 1/5/2022  End Date: --    ERGOCALCIFEROL (ERGOCALCIFEROL) 50,000 UNIT CAP    Take 50,000 Int'l Units by mouth every 7 days.       Start Date: 2/1/2022  End Date: --    FERROUS SULFATE 325 (65 FE) MG EC TABLET    Take 325 mg by mouth 3 (three) times daily with meals.       Start Date: --        End Date: --    GLIPIZIDE (GLUCOTROL) 5 MG TR24    Take 1 tablet (5 mg total) by mouth daily with breakfast.       Start Date: 7/19/2022 End Date: 7/19/2023    MELOXICAM (MOBIC) 15 MG TABLET    Take 1 tablet (15 mg total) by mouth daily as needed for Pain.       Start Date: 7/6/2022  End Date: 7/6/2023    SIMVASTATIN (ZOCOR) 80 MG TABLET    Take 80 mg by mouth once daily.       Start Date: 1/5/2022  End Date: --   Changed and/or Refilled Medications    Modified Medication Previous Medication    METFORMIN (GLUCOPHAGE) 500 MG TABLET metFORMIN (GLUCOPHAGE) 500 MG tablet       Take 1 tablet (500 mg total) by mouth 2 (two) times  daily with meals.    Take 500 mg by mouth 2 (two) times daily with meals.       Start Date: 12/7/2022 End Date: 12/7/2023    Start Date: --        End Date: 12/7/2022   Discontinued Medications    EMPAGLIFLOZIN (JARDIANCE) 25 MG TABLET    Take 1 tablet (25 mg total) by mouth once daily. 2 month sample supply given to patient       Start Date: 10/18/2022End Date: 12/7/2022          Follow up in about 3 months (around 3/7/2023) for Wellness.

## 2022-12-13 ENCOUNTER — OFFICE VISIT (OUTPATIENT)
Dept: ORTHOPEDICS | Facility: CLINIC | Age: 57
End: 2022-12-13
Payer: COMMERCIAL

## 2022-12-13 DIAGNOSIS — M54.12 CERVICAL RADICULOPATHY: ICD-10-CM

## 2022-12-13 DIAGNOSIS — S40.852A FOREIGN BODY OF LEFT UPPER ARM: Primary | ICD-10-CM

## 2022-12-13 PROCEDURE — 3061F NEG MICROALBUMINURIA REV: CPT | Mod: CPTII,,, | Performed by: ORTHOPAEDIC SURGERY

## 2022-12-13 PROCEDURE — 99203 PR OFFICE/OUTPT VISIT, NEW, LEVL III, 30-44 MIN: ICD-10-PCS | Mod: ,,, | Performed by: ORTHOPAEDIC SURGERY

## 2022-12-13 PROCEDURE — 3066F NEPHROPATHY DOC TX: CPT | Mod: CPTII,,, | Performed by: ORTHOPAEDIC SURGERY

## 2022-12-13 PROCEDURE — 1160F RVW MEDS BY RX/DR IN RCRD: CPT | Mod: CPTII,,, | Performed by: ORTHOPAEDIC SURGERY

## 2022-12-13 PROCEDURE — 4010F PR ACE/ARB THEARPY RXD/TAKEN: ICD-10-PCS | Mod: CPTII,,, | Performed by: ORTHOPAEDIC SURGERY

## 2022-12-13 PROCEDURE — 1160F PR REVIEW ALL MEDS BY PRESCRIBER/CLIN PHARMACIST DOCUMENTED: ICD-10-PCS | Mod: CPTII,,, | Performed by: ORTHOPAEDIC SURGERY

## 2022-12-13 PROCEDURE — 3052F HG A1C>EQUAL 8.0%<EQUAL 9.0%: CPT | Mod: CPTII,,, | Performed by: ORTHOPAEDIC SURGERY

## 2022-12-13 PROCEDURE — 1159F PR MEDICATION LIST DOCUMENTED IN MEDICAL RECORD: ICD-10-PCS | Mod: CPTII,,, | Performed by: ORTHOPAEDIC SURGERY

## 2022-12-13 PROCEDURE — 99203 OFFICE O/P NEW LOW 30 MIN: CPT | Mod: ,,, | Performed by: ORTHOPAEDIC SURGERY

## 2022-12-13 PROCEDURE — 3066F PR DOCUMENTATION OF TREATMENT FOR NEPHROPATHY: ICD-10-PCS | Mod: CPTII,,, | Performed by: ORTHOPAEDIC SURGERY

## 2022-12-13 PROCEDURE — 1159F MED LIST DOCD IN RCRD: CPT | Mod: CPTII,,, | Performed by: ORTHOPAEDIC SURGERY

## 2022-12-13 PROCEDURE — 3061F PR NEG MICROALBUMINURIA RESULT DOCUMENTED/REVIEW: ICD-10-PCS | Mod: CPTII,,, | Performed by: ORTHOPAEDIC SURGERY

## 2022-12-13 PROCEDURE — 4010F ACE/ARB THERAPY RXD/TAKEN: CPT | Mod: CPTII,,, | Performed by: ORTHOPAEDIC SURGERY

## 2022-12-13 PROCEDURE — 3052F PR MOST RECENT HEMOGLOBIN A1C LEVEL 8.0 - < 9.0%: ICD-10-PCS | Mod: CPTII,,, | Performed by: ORTHOPAEDIC SURGERY

## 2022-12-13 NOTE — PROGRESS NOTES
[facility]  Orthopedic Clinic Note      Chief Complaint:   Chief Complaint   Patient presents with    Left Upper Arm - Pain    Arm Pain     left upper arm painful, X-rays done at [facility] 11/10/22, radiates to hand, no prior surgery or injury, pt states she has been having pain in that area since she had the COVID vaccine 3/09/2021, 4/07/2021 & booster 11/30/2021. Unable to obtain vitals.      Referring Physician: Satcey Hendrickson, *      History of Present Illness:    This is a 56 y.o. year old female presenting with complaints of left arm pain since Spring of 2021.  She states that she noticed this pain after receiving the COVID vaccine and booster.  She states that the pain radiates down her arm into her lower forearm and hand.  The pain is most notable in her upper arm.  She has been taking prescribed meloxicam and tramadol from her PCP who has ordered x-rays of her upper extremities and cervical spine.  The x-ray of her cervical spine demonstrated moderate disc disease at C5-C6 and C6-C7.  X-rays of her left upper extremity demonstrated a small metallic foreign body in the distal left arm.  She was referred for evaluation of the metallic foreign body.      Past Medical History:   Diagnosis Date    Anemia, unspecified     HTN (hypertension)     Hypercholesterolemia     RUFINO (obstructive sleep apnea)     Type 2 diabetes mellitus without complications     Vitamin D deficiency        Past Surgical History:   Procedure Laterality Date    CHOLECYSTECTOMY         Current Outpatient Medications   Medication Sig    aspirin (ECOTRIN) 81 MG EC tablet Take 81 mg by mouth once daily.    benazepril-hydrochlorthiazide (LOTENSIN HCT) 20-25 mg Tab Take 1 tablet by mouth once daily.    ergocalciferol (ERGOCALCIFEROL) 50,000 unit Cap Take 50,000 Int'l Units by mouth every 7 days.    glipiZIDE (GLUCOTROL) 5 MG TR24 Take 1 tablet (5 mg total) by mouth daily with breakfast.    meloxicam (MOBIC) 15 MG tablet Take 1 tablet  (15 mg total) by mouth daily as needed for Pain.    metFORMIN (GLUCOPHAGE) 500 MG tablet Take 1 tablet (500 mg total) by mouth 2 (two) times daily with meals.    simvastatin (ZOCOR) 80 MG tablet Take 80 mg by mouth once daily.    traMADoL (ULTRAM) 50 mg tablet Take 1 tablet (50 mg total) by mouth every 12 (twelve) hours as needed for Pain.    ferrous sulfate 325 (65 FE) MG EC tablet Take 325 mg by mouth 3 (three) times daily with meals.     No current facility-administered medications for this visit.       Review of patient's allergies indicates:  No Known Allergies    Family History   Problem Relation Age of Onset    Diabetes Mother     Diabetes Sister     Diabetes Brother        Social History     Socioeconomic History    Marital status: Unknown   Tobacco Use    Smoking status: Never    Smokeless tobacco: Never   Substance and Sexual Activity    Alcohol use: Yes    Drug use: Never    Sexual activity: Yes           Review of Systems:  All review of systems negative except for those stated in the HPI.    Examination:    Vital Signs:    Vitals:    12/13/22 1455   PainSc:   7       There is no height or weight on file to calculate BMI.    Physical Examination:  General: Well-developed, well-nourished.  Neuro: Alert and oriented x 3.  Psych: Normal mood and affect.  Left Shoulder Exam:  No obvious deformity. No medial or lateral scapula winging. Forward flexion to 170 degrees and abduction to 170 degrees and external rotation 80 degrees is and internal rotation is 80 degrees. Negative empty can, Whipple, Drop Arm Test, Collins, impingement, AC joint tenderness. Negative biceps groove tenderness, Grider´s, Yergason´s, Speed test. Negative Apprehension and Relocation test. 4/5 strength, normal skin appearance and palpable pulses distally. Sensibility normal.      Assessment: Foreign body of left upper arm  -     Ambulatory referral/consult to Orthopedics    Cervical radiculopathy      Plan: Prior x-rays were reviewed  with the patient and her daughter.  Recommend continuing her previously prescribed meloxicam and tramadol.  If she does not find it helpful, we discussed trying a different anti-inflammatory and referral to formal physical therapy.  For now, I will place her on a home exercise program.  Recommend that she proceed with her previously ordered MRI of the cervical spine as well.  She will return to clinic as needed for any additional issues or concerns.  She and her daughter verbalized understanding of the plan of care with no further questions.    Harpreet Potter MD personally performed the services described in this documentation, including but not limited to patient's history, physical examination, and assessment and plan of care. All medical record entries made by JULIEN Mathur were performed at his direction and in his presence. The medical record was reviewed and is accurate and complete.         Follow up if symptoms worsen or fail to improve.      DISCLAIMER: This note may have been dictated using voice recognition software and may contain grammatical errors.     NOTE: Consult report sent to referring provider via [facility].

## 2022-12-27 ENCOUNTER — TELEPHONE (OUTPATIENT)
Dept: FAMILY MEDICINE | Facility: CLINIC | Age: 57
End: 2022-12-27
Payer: COMMERCIAL

## 2022-12-27 NOTE — TELEPHONE ENCOUNTER
----- Message from Vitor Shanks sent at 12/27/2022  1:52 PM CST -----  .Type:  Needs Medical Advice    Who Called: pt  Would the patient rather a call back or a response via [facility]? Call back  Best Call Back Number: [phone]  Additional Information: dates on her back problem complains

## 2022-12-27 NOTE — TELEPHONE ENCOUNTER
Spoke to pt. Pt reported needing dates on her back problem complains. Told pt would call her back with those dates.

## 2022-12-30 ENCOUNTER — DOCUMENTATION ONLY (OUTPATIENT)
Dept: ADMINISTRATIVE | Facility: HOSPITAL | Age: 57
End: 2022-12-30
Payer: COMMERCIAL

## 2023-01-04 DIAGNOSIS — E11.65 TYPE 2 DIABETES MELLITUS WITH HYPERGLYCEMIA, WITHOUT LONG-TERM CURRENT USE OF INSULIN: Primary | ICD-10-CM

## 2023-01-04 RX ORDER — BENAZEPRIL/HYDROCHLOROTHIAZIDE 20 MG-25MG
1 TABLET ORAL DAILY
Qty: 90 TABLET | Refills: 3 | Status: SHIPPED | OUTPATIENT
Start: 2023-01-04 | End: 2023-06-29 | Stop reason: SDUPTHER

## 2023-01-04 RX ORDER — SIMVASTATIN 80 MG/1
80 TABLET, FILM COATED ORAL NIGHTLY
Qty: 90 TABLET | Refills: 3 | Status: SHIPPED | OUTPATIENT
Start: 2023-01-04 | End: 2023-05-16

## 2023-01-04 NOTE — TELEPHONE ENCOUNTER
Pt was calling to let us know she has her new insurance. Would to get back on Trijardy XR.    Also needs refill of Zocor and Lotensin HCT. I routed it.     Pt also states she was wondering if you could write up a letter or paperwork stating she cant work because of her arm problems and diabetes is making her weak. She would like to bring it to the Social Security Office.     Please advise. Thanks.

## 2023-01-04 NOTE — TELEPHONE ENCOUNTER
----- Message from Vitor Shanks sent at 1/4/2023  2:10 PM CST -----  .Type:  Needs Medical Advice    Who Called: pt  Would the patient rather a call back or a response via [facility]? Call back  Best Call Back Number: [phone]   Additional Information: pt is asking for clarification on medication and switching medication

## 2023-01-09 ENCOUNTER — TELEPHONE (OUTPATIENT)
Dept: FAMILY MEDICINE | Facility: CLINIC | Age: 58
End: 2023-01-09
Payer: COMMERCIAL

## 2023-01-09 NOTE — TELEPHONE ENCOUNTER
----- Message from Gordo Duarte sent at 1/6/2023 11:24 AM CST -----  .Type:  Needs Medical Advice    Who Called: Patient   Symptoms (please be specific):    How long has patient had these symptoms:    Pharmacy name and phone #:    Would the patient rather a call back or a response via [facility]?   Best Call Back Number: [phone]  Additional Information: re: her medication she had questions about a coupon she would need. She told me it was for her empaglifloz-linaglip-metformin 25-5-1,000 mg [facility]

## 2023-01-20 ENCOUNTER — TELEPHONE (OUTPATIENT)
Dept: FAMILY MEDICINE | Facility: CLINIC | Age: 58
End: 2023-01-20
Payer: COMMERCIAL

## 2023-01-20 NOTE — TELEPHONE ENCOUNTER
----- Message from Gordo Duarte sent at 1/20/2023 11:21 AM CST -----  .Type:  Needs Medical Advice    Who Called: Cathie  Symptoms (please be specific):    How long has patient had these symptoms:    Pharmacy name and phone #:    Would the patient rather a call back or a response via [facility]?   Best Call Back Number: [phone]  Additional Information: She needed to speak with the nurse she went to walk in clinic and she needed to tell the nurse what they told her to do.

## 2023-01-23 DIAGNOSIS — R25.2 MUSCLE CRAMPS: Primary | ICD-10-CM

## 2023-01-24 ENCOUNTER — TELEPHONE (OUTPATIENT)
Dept: FAMILY MEDICINE | Facility: CLINIC | Age: 58
End: 2023-01-24
Payer: COMMERCIAL

## 2023-01-24 NOTE — TELEPHONE ENCOUNTER
----- Message from Gordo Duarte sent at 1/24/2023  3:11 PM CST -----  .Type:  Needs Medical Advice    Who Called: Cathie  Symptoms (please be specific):    How long has patient had these symptoms:    Pharmacy name and phone #:    Would the patient rather a call back or a response via [facility]?   Best Call Back Number: [phone]  Additional Information:  She wanted to speak with Ms. Lua she told me her leg is bothering her again.

## 2023-01-26 ENCOUNTER — OFFICE VISIT (OUTPATIENT)
Dept: FAMILY MEDICINE | Facility: CLINIC | Age: 58
End: 2023-01-26
Payer: COMMERCIAL

## 2023-01-26 VITALS
WEIGHT: 145.81 LBS | SYSTOLIC BLOOD PRESSURE: 111 MMHG | HEART RATE: 77 BPM | OXYGEN SATURATION: 98 % | BODY MASS INDEX: 25.83 KG/M2 | DIASTOLIC BLOOD PRESSURE: 68 MMHG

## 2023-01-26 DIAGNOSIS — R25.2 MUSCLE CRAMPS: ICD-10-CM

## 2023-01-26 DIAGNOSIS — E78.5 HYPERLIPIDEMIA ASSOCIATED WITH TYPE 2 DIABETES MELLITUS: ICD-10-CM

## 2023-01-26 DIAGNOSIS — E11.65 CONTROLLED TYPE 2 DIABETES MELLITUS WITH HYPERGLYCEMIA, WITHOUT LONG-TERM CURRENT USE OF INSULIN: ICD-10-CM

## 2023-01-26 DIAGNOSIS — M79.605 BILATERAL LEG PAIN: Primary | ICD-10-CM

## 2023-01-26 DIAGNOSIS — M79.641 BILATERAL HAND PAIN: ICD-10-CM

## 2023-01-26 DIAGNOSIS — E11.69 HYPERLIPIDEMIA ASSOCIATED WITH TYPE 2 DIABETES MELLITUS: ICD-10-CM

## 2023-01-26 DIAGNOSIS — M79.642 BILATERAL HAND PAIN: ICD-10-CM

## 2023-01-26 DIAGNOSIS — M79.604 BILATERAL LEG PAIN: Primary | ICD-10-CM

## 2023-01-26 DIAGNOSIS — E55.9 VITAMIN D DEFICIENCY: ICD-10-CM

## 2023-01-26 LAB
BUN SERPL-MCNC: 30 MG/DL (ref 6–24)
BUN/CREAT SERPL: 23 (ref 9–23)
CALCIUM SERPL-MCNC: 9.8 MG/DL (ref 8.7–10.2)
CHLORIDE SERPL-SCNC: 99 MMOL/L (ref 96–106)
CK SERPL-CCNC: 104 U/L (ref 32–182)
CO2 SERPL-SCNC: 26 MMOL/L (ref 20–29)
CREAT SERPL-MCNC: 1.28 MG/DL (ref 0.57–1)
EST. GFR  (NO RACE VARIABLE): 49 ML/MIN/1.73
GLUCOSE SERPL-MCNC: 126 MG/DL (ref 70–99)
MAGNESIUM SERPL-MCNC: 2.4 MG/DL (ref 1.6–2.3)
POTASSIUM SERPL-SCNC: 4.2 MMOL/L (ref 3.5–5.2)
SODIUM SERPL-SCNC: 140 MMOL/L (ref 134–144)
SPECIMEN STATUS REPORT: NORMAL

## 2023-01-26 PROCEDURE — 4010F PR ACE/ARB THEARPY RXD/TAKEN: ICD-10-PCS | Mod: ,,, | Performed by: FAMILY MEDICINE

## 2023-01-26 PROCEDURE — 4010F ACE/ARB THERAPY RXD/TAKEN: CPT | Mod: ,,, | Performed by: FAMILY MEDICINE

## 2023-01-26 PROCEDURE — 99214 PR OFFICE/OUTPT VISIT, EST, LEVL IV, 30-39 MIN: ICD-10-PCS | Mod: ,,, | Performed by: FAMILY MEDICINE

## 2023-01-26 PROCEDURE — 99214 OFFICE O/P EST MOD 30 MIN: CPT | Mod: ,,, | Performed by: FAMILY MEDICINE

## 2023-01-26 NOTE — PROGRESS NOTES
Patient ID: [id number]     Chief Complaint: Leg Pain        HPI:     Cathie Santos is a 57 y.o. female here today for a follow up. Patient complains of leg pain, spasms in both feet and calves with right worse than left starting 01/18/2023, she went to [facility] and was told to drink water and eat bananas without resolution of symptoms, was told to followup with her PCP for testing for rhabdomyolysis and lactic acid. She had labs done on 01/25/2023, here to discuss the results today. She denies swelling in her legs or erythema, but she is worried about blood clots in her legs. She is walking without assistance. She has bilateral hand pain and cramping as well. She denies family history of auto-immune disease. She reports that she hasn't been drinking enough water, but she is trying to improve.  She denies injury. 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 She has DJD C-spine, refuses MRI C-spine at this time, wants to determine what is going on with her leg pain first.   - She has HLD with DMII, both are stable with Rx, no side effects.   - She is seeing [facility] for breast imaging, awaiting results.   - Patient is without any other complaints today.         ----------------------------  Anemia, unspecified  HTN (hypertension)  Hypercholesterolemia  RUFINO (obstructive sleep apnea)  Type 2 diabetes mellitus without complications  Vitamin D deficiency     Past Surgical History:   Procedure Laterality Date    CHOLECYSTECTOMY         Review of patient's allergies indicates:  No Known Allergies    Outpatient Medications Marked as Taking for the 1/26/23 encounter (Office Visit) with Stacey Hendrickson MD   Medication Sig Dispense Refill    aspirin (ECOTRIN) 81 MG EC tablet Take 81 mg by mouth once daily.      benazepril-hydrochlorthiazide (LOTENSIN HCT) 20-25 mg Tab Take 1 tablet by mouth once daily. 90 tablet 3    empaglifloz-linaglip-metformin 25-5-1,000 mg  TBph Take 1 tablet by mouth once daily. 90 tablet 3    ergocalciferol (ERGOCALCIFEROL) 50,000 unit Cap Take 50,000 Int'l Units by mouth every 7 days.      ferrous sulfate 325 (65 FE) MG EC tablet Take 325 mg by mouth 3 (three) times daily with meals.      glipiZIDE (GLUCOTROL) 5 MG TR24 Take 1 tablet (5 mg total) by mouth daily with breakfast. 90 tablet 3    meloxicam (MOBIC) 15 MG tablet Take 1 tablet (15 mg total) by mouth daily as needed for Pain. 90 tablet 3    simvastatin (ZOCOR) 80 MG tablet Take 1 tablet (80 mg total) by mouth nightly. 90 tablet 3    traMADoL (ULTRAM) 50 mg tablet Take 1 tablet (50 mg total) by mouth every 12 (twelve) hours as needed for Pain. 60 tablet 0       Social History     Socioeconomic History    Marital status: Unknown   Tobacco Use    Smoking status: Never    Smokeless tobacco: Never   Substance and Sexual Activity    Alcohol use: Yes    Drug use: Never    Sexual activity: Yes        Family History   Problem Relation Age of Onset    Diabetes Mother     Diabetes Sister     Diabetes Brother         Subjective:       Review of Systems:    See HPI for details    Constitutional: Denies Change in appetite. Denies Chills. Denies Fever. Denies Night sweats.  Eye: Denies Blurred vision. Denies Discharge. Denies Eye pain.  ENT: Denies Decreased hearing. Denies Sore throat. Denies Swollen glands.  Respiratory: Denies Cough. Denies Shortness of breath. Denies Shortness of breath with exertion. Denies Wheezing.  Cardiovascular: Denies Chest pain at rest. Denies Chest pain with exertion. Denies Irregular heartbeat. Denies Palpitations.  Gastrointestinal: Denies Abdominal pain. Denies Diarrhea. Denies Nausea. Denies Vomiting. Denies Hematemesis or Hematochezia.  Genitourinary: Denies Dysuria. Denies Urinary frequency. Denies Urinary urgency. Denies Blood in urine.  Endocrine: Denies Cold intolerance. Denies Excessive thirst. Denies Heat intolerance. Denies Weight loss. Denies Weight  gain.  Musculoskeletal: Painful joints. Denies Weakness.  Integumentary: Denies Rash. Denies Itching. Denies Dry skin.  Neurologic: Denies Dizziness. Denies Fainting. Denies Headache.  Psychiatric: Denies Depression. Denies Anxiety. Denies Suicidal/Homicidal ideations.    All Other ROS: Negative except as stated in HPI.       Objective:     /68 (BP Location: Right arm, Patient Position: Sitting, BP Method: Medium (Automatic))   Pulse 77   Wt 66.1 kg (145 lb 12.8 oz)   SpO2 98%   BMI 25.83 kg/m²     Physical Exam    General: Alert and oriented, No acute distress. Overweight.   Head: Normocephalic, Atraumatic.  Eye: Pupils are equal, round and reactive to light, Extraocular movements are intact, Sclera non-icteric.  Ears/Nose/Throat: Normal, Mucosa moist,Clear.  Neck/Thyroid: Supple, Non-tender, No carotid bruit, No palpable thyromegaly or thyroid nodule, No lymphadenopathy, No JVD, Full range of motion.  Respiratory: Clear to auscultation bilaterally; No wheezes, rales or rhonchi,Non-labored respirations, Symmetrical chest wall expansion.  Cardiovascular: Regular rate and rhythm, S1/S2 normal, No murmurs, rubs or gallops.  Gastrointestinal: Soft, Non-tender, Non-distended, Normal bowel sounds, No palpable organomegaly.  Musculoskeletal: Normal range of motion. Bilateral LE with varicose veins, no erythema, no warmth, no effusion, FROM, negative Cristel's, negative cord sign. Bilateral hands with hypopigmentation, non-tender, no erythema, no effusion, FROM, no crepitus.   Integumentary: Warm, Dry, Intact, No suspicious lesions or rashes.  Extremities: No clubbing, cyanosis or edema  Neurologic: No focal deficits, Cranial Nerves II-XII are grossly intact, Motor strength normal upper and lower extremities, Sensory exam intact.  Psychiatric: Normal interaction, Coherent speech, Euthymic mood, Appropriate affect         Assessment:       ICD-10-CM ICD-9-CM   1. Bilateral leg pain  M79.604 729.5    M79.605    2.  Bilateral hand pain  M79.641 729.5    M79.642    3. Muscle cramps  R25.2 729.82   4. Controlled type 2 diabetes mellitus with hyperglycemia, without long-term current use of insulin  E11.65 250.80     790.29   5. Hyperlipidemia associated with type 2 diabetes mellitus  E11.69 250.80    E78.5 272.4   6. Vitamin D deficiency  E55.9 268.9        Plan:     Problem List Items Addressed This Visit          Cardiac/Vascular    Hyperlipidemia associated with type 2 diabetes mellitus       Endocrine    Controlled type 2 diabetes mellitus with hyperglycemia, without long-term current use of insulin     Other Visit Diagnoses       Bilateral leg pain    -  Primary    Relevant Orders    CV Ultrasound doppler venous DVT leg left    CV Ultrasound doppler venous DVT leg right    CBC Auto Differential    Comprehensive Metabolic Panel    Iron and TIBC    TSH    Vitamin B12    C-Reactive Protein    Uric Acid    ANTINUCLEAR ANTIBODIES COMPREHENSIVE PANEL    Phosphorus    Urinalysis, Reflex to Urine Culture    Lactic Acid, Plasma    Rheumatoid Factors, IgA, IgG, IgM    CYCLIC CITRULLINATED PEPTIDE (CCP) ANTIBODY    Bilateral hand pain        Relevant Orders    CBC Auto Differential    Comprehensive Metabolic Panel    Iron and TIBC    TSH    Vitamin B12    C-Reactive Protein    Uric Acid    ANTINUCLEAR ANTIBODIES COMPREHENSIVE PANEL    Phosphorus    Urinalysis, Reflex to Urine Culture    Lactic Acid, Plasma    Rheumatoid Factors, IgA, IgG, IgM    CYCLIC CITRULLINATED PEPTIDE (CCP) ANTIBODY    Muscle cramps        Relevant Orders    CBC Auto Differential    Comprehensive Metabolic Panel    Iron and TIBC    TSH    Vitamin B12    C-Reactive Protein    Uric Acid    ANTINUCLEAR ANTIBODIES COMPREHENSIVE PANEL    Phosphorus    Urinalysis, Reflex to Urine Culture    Lactic Acid, Plasma    Rheumatoid Factors, IgA, IgG, IgM    CYCLIC CITRULLINATED PEPTIDE (CCP) ANTIBODY    Vitamin D deficiency        Relevant Orders    Vitamin D         1.  Bilateral leg pain  - CV Ultrasound doppler venous DVT leg left; Future  - CV Ultrasound doppler venous DVT leg right; Future  - CBC Auto Differential; Future  - Comprehensive Metabolic Panel; Future  - Iron and TIBC; Future  - TSH; Future  - Vitamin B12; Future  - C-Reactive Protein; Future  - Uric Acid; Future  - ANTINUCLEAR ANTIBODIES COMPREHENSIVE PANEL; Future  - Phosphorus; Future  - Urinalysis, Reflex to Urine Culture; Future  - Lactic Acid, Plasma; Future  - Rheumatoid Factors, IgA, IgG, IgM; Future  - CYCLIC CITRULLINATED PEPTIDE (CCP) ANTIBODY; Future  - Will treat pending results. Continue Meloxicam and Tramadol as directed. Notify M.D. or ER if symptoms persist or worsen, SOB, temp >100.4, or any acute illness.      2. Bilateral hand pain  - CBC Auto Differential; Future  - Comprehensive Metabolic Panel; Future  - Iron and TIBC; Future  - TSH; Future  - Vitamin B12; Future  - C-Reactive Protein; Future  - Uric Acid; Future  - ANTINUCLEAR ANTIBODIES COMPREHENSIVE PANEL; Future  - Phosphorus; Future  - Urinalysis, Reflex to Urine Culture; Future  - Lactic Acid, Plasma; Future  - Rheumatoid Factors, IgA, IgG, IgM; Future  - CYCLIC CITRULLINATED PEPTIDE (CCP) ANTIBODY; Future  - Same as #1.     3. Muscle cramps  - CBC Auto Differential; Future  - Comprehensive Metabolic Panel; Future  - Iron and TIBC; Future  - TSH; Future  - Vitamin B12; Future  - C-Reactive Protein; Future  - Uric Acid; Future  - ANTINUCLEAR ANTIBODIES COMPREHENSIVE PANEL; Future  - Phosphorus; Future  - Urinalysis, Reflex to Urine Culture; Future  - Lactic Acid, Plasma; Future  - Rheumatoid Factors, IgA, IgG, IgM; Future  - CYCLIC CITRULLINATED PEPTIDE (CCP) ANTIBODY; Future  - Will discontinue HCTZ  if symptoms persist and workup is negative. Same as #1.    4. Controlled type 2 diabetes mellitus with hyperglycemia, without long-term current use of insulin  - DM II is improving. Continue current treatment plan. Recheck CMP, HgA1C in  03/2023. Keep daily fasting CBG log. Keep appointment for annual eye exam as scheduled. Notify M.D. or ER if CBG >400 or <60, polyuria, polydipsia, or polyphagia.         Lab Results   Component Value Date     HGBA1C 8.5 (H) 12/02/2022      Continue   On ACE and Statin according to guidelines.  Follow ADA Diet. Avoid soda, simple sweets, and limit rice/pasta/breads/starches.  Maintain healthy weight with goal BMI <30.  Exercise 5 times per week for 30 minutes per day.  Stressed importance of daily foot exams.  Stressed importance of annual dilated eye exam.     5. Hyperlipidemia associated with type 2 diabetes mellitus  - Cholesterol is well controlled, LDL: 83, continue current treatment plan, recheck CMP, FLP, CPK in 06/2023.  Continue  Stressed importance of dietary modifications. Follow a low cholesterol, low saturated fat diet with less that 200mg of cholesterol a day.  Avoid fried foods and high saturated fats (high saturated fats less than 7% of calories).  Add Flax Seed/Fish Oil supplements to diet. Increase dietary fiber.  Regular exercise can reduce LDL and raise HDL. Stressed importance of physical activity 5 times per week for 30 minutes per day.      6. Vitamin D deficiency  - Vitamin D; Future  - Will treat pending results.       Cathie was seen today for leg pain.    Diagnoses and all orders for this visit:    Bilateral leg pain  -     CV Ultrasound doppler venous DVT leg left; Future  -     CV Ultrasound doppler venous DVT leg right; Future  -     CBC Auto Differential; Future  -     Comprehensive Metabolic Panel; Future  -     Iron and TIBC; Future  -     TSH; Future  -     Vitamin B12; Future  -     C-Reactive Protein; Future  -     Uric Acid; Future  -     ANTINUCLEAR ANTIBODIES COMPREHENSIVE PANEL; Future  -     Phosphorus; Future  -     Urinalysis, Reflex to Urine Culture; Future  -     Lactic Acid, Plasma; Future  -     Rheumatoid Factors, IgA, IgG, IgM; Future  -     CYCLIC CITRULLINATED  PEPTIDE (CCP) ANTIBODY; Future    Bilateral hand pain  -     CBC Auto Differential; Future  -     Comprehensive Metabolic Panel; Future  -     Iron and TIBC; Future  -     TSH; Future  -     Vitamin B12; Future  -     C-Reactive Protein; Future  -     Uric Acid; Future  -     ANTINUCLEAR ANTIBODIES COMPREHENSIVE PANEL; Future  -     Phosphorus; Future  -     Urinalysis, Reflex to Urine Culture; Future  -     Lactic Acid, Plasma; Future  -     Rheumatoid Factors, IgA, IgG, IgM; Future  -     CYCLIC CITRULLINATED PEPTIDE (CCP) ANTIBODY; Future    Muscle cramps  -     CBC Auto Differential; Future  -     Comprehensive Metabolic Panel; Future  -     Iron and TIBC; Future  -     TSH; Future  -     Vitamin B12; Future  -     C-Reactive Protein; Future  -     Uric Acid; Future  -     ANTINUCLEAR ANTIBODIES COMPREHENSIVE PANEL; Future  -     Phosphorus; Future  -     Urinalysis, Reflex to Urine Culture; Future  -     Lactic Acid, Plasma; Future  -     Rheumatoid Factors, IgA, IgG, IgM; Future  -     CYCLIC CITRULLINATED PEPTIDE (CCP) ANTIBODY; Future    Controlled type 2 diabetes mellitus with hyperglycemia, without long-term current use of insulin    Hyperlipidemia associated with type 2 diabetes mellitus    Vitamin D deficiency  -     Vitamin D; Future          Medication List with Changes/Refills   Current Medications    ASPIRIN (ECOTRIN) 81 MG EC TABLET    Take 81 mg by mouth once daily.       Start Date: --        End Date: --    BENAZEPRIL-HYDROCHLORTHIAZIDE (LOTENSIN HCT) 20-25 MG TAB    Take 1 tablet by mouth once daily.       Start Date: 1/4/2023  End Date: 1/4/2024    EMPAGLIFLOZ-LINAGLIP-METFORMIN 25-5-1,000 MG TBPH    Take 1 tablet by mouth once daily.       Start Date: 1/4/2023  End Date: 1/4/2024    ERGOCALCIFEROL (ERGOCALCIFEROL) 50,000 UNIT CAP    Take 50,000 Int'l Units by mouth every 7 days.       Start Date: 2/1/2022  End Date: --    FERROUS SULFATE 325 (65 FE) MG EC TABLET    Take 325 mg by mouth 3  (three) times daily with meals.       Start Date: --        End Date: --    GLIPIZIDE (GLUCOTROL) 5 MG TR24    Take 1 tablet (5 mg total) by mouth daily with breakfast.       Start Date: 7/19/2022 End Date: 7/19/2023    MELOXICAM (MOBIC) 15 MG TABLET    Take 1 tablet (15 mg total) by mouth daily as needed for Pain.       Start Date: 7/6/2022  End Date: 7/6/2023    SIMVASTATIN (ZOCOR) 80 MG TABLET    Take 1 tablet (80 mg total) by mouth nightly.       Start Date: 1/4/2023  End Date: 1/4/2024    TRAMADOL (ULTRAM) 50 MG TABLET    Take 1 tablet (50 mg total) by mouth every 12 (twelve) hours as needed for Pain.       Start Date: 12/7/2022 End Date: --          Follow up if symptoms worsen or fail to improve.

## 2023-01-30 ENCOUNTER — TELEPHONE (OUTPATIENT)
Dept: FAMILY MEDICINE | Facility: CLINIC | Age: 58
End: 2023-01-30
Payer: COMMERCIAL

## 2023-01-30 DIAGNOSIS — N17.9 AKI (ACUTE KIDNEY INJURY): Primary | ICD-10-CM

## 2023-01-30 NOTE — PROGRESS NOTES
Please inform patient of lab results.    1. ANNABEL  ANNABEL recs  Increase Hydration  Avoid Nephrotoxic medications like NSAIDS,   Please confirm if the patient took new antibiotics or recreational drugs ,dehydration, diarrhea, vomiting, acute illness, hospitalization, recent angiograms or exposure to IV radiocontrast.  Monitor BP and CBG closely   Please follow up with your doctor  Will need Retroperitoneal US   Other labwork within acceptable ranges.    Thanks,    Dr. Manning

## 2023-01-30 NOTE — TELEPHONE ENCOUNTER
----- Message from Stacey Hendrickson MD sent at 1/27/2023 12:29 PM CST -----  Bilateral leg ultrasounds are negative for blood clots (DVT).

## 2023-01-31 DIAGNOSIS — I70.0 ATHEROSCLEROSIS OF ABDOMINAL AORTA: Primary | ICD-10-CM

## 2023-01-31 DIAGNOSIS — N28.1 BENIGN CYST OF RIGHT KIDNEY: ICD-10-CM

## 2023-01-31 NOTE — PROGRESS NOTES
Spoke with pt about her lab results, informed her of new lab and US orders. Faxed orders to [facility] as requested by pt

## 2023-02-01 ENCOUNTER — TELEPHONE (OUTPATIENT)
Dept: FAMILY MEDICINE | Facility: CLINIC | Age: 58
End: 2023-02-01
Payer: COMMERCIAL

## 2023-02-01 NOTE — TELEPHONE ENCOUNTER
----- Message from Keesha Manning MD sent at 1/30/2023  3:58 PM CST -----  Please inform patient of lab results.    1. ANNABEL  ANNABEL recs  Increase Hydration  Avoid Nephrotoxic medications like NSAIDS,   Please confirm if the patient took new antibiotics or recreational drugs ,dehydration, diarrhea, vomiting, acute illness, hospitalization, recent angiograms or exposure to IV radiocontrast.  Monitor BP and CBG closely   Please follow up with your doctor  Will need Retroperitoneal US   Other labwork within acceptable ranges.    Thanks,    Dr. Manning

## 2023-02-01 NOTE — TELEPHONE ENCOUNTER
Called pt, voiced understanding and thanks.     Pt voiced she would like to know more information about why she has ANNABEL. Please advise. Thanks.

## 2023-02-02 ENCOUNTER — TELEPHONE (OUTPATIENT)
Dept: FAMILY MEDICINE | Facility: CLINIC | Age: 58
End: 2023-02-02
Payer: COMMERCIAL

## 2023-02-02 DIAGNOSIS — J01.90 ACUTE SINUSITIS, RECURRENCE NOT SPECIFIED, UNSPECIFIED LOCATION: Primary | ICD-10-CM

## 2023-02-02 RX ORDER — AZITHROMYCIN 250 MG/1
TABLET, FILM COATED ORAL
Qty: 6 TABLET | Refills: 0 | Status: SHIPPED | OUTPATIENT
Start: 2023-02-02 | End: 2023-02-07

## 2023-02-02 NOTE — TELEPHONE ENCOUNTER
----- Message from Gordo Duarte sent at 2/2/2023 11:21 AM CST -----  .Type:  Needs Medical Advice    Who Called: Cathie  Symptoms (please be specific):    How long has patient had these symptoms:    Pharmacy name and phone #:    Would the patient rather a call back or a response via [facility]?   Best Call Back Number: [phone]  Additional Information: She needs to speak with the nurse re: her needing some cough medication she is currently in urgent care they can prescribe some medication but they are not sure whether they can prescribe. Please give her a call when available.

## 2023-02-02 NOTE — TELEPHONE ENCOUNTER
Pt voiced she went to Urgent care at [facility] this morning for sore throat and nasal congestion. Pt was prescribed Promethazine-DM 6.25-15 mg/5 mL syrup. Pt would like to know if this is okay for her to take. Also complains of a bad sinus congestion, would like something to help clear her up. Please and thank you!

## 2023-02-08 DIAGNOSIS — R76.8 POSITIVE ANTINUCLEAR ANTIBODY: Primary | ICD-10-CM

## 2023-02-08 DIAGNOSIS — N17.9 AKI (ACUTE KIDNEY INJURY): ICD-10-CM

## 2023-02-08 DIAGNOSIS — M25.50 CYCLIC CITRULLINATED PEPTIDE (CCP) ANTIBODY POSITIVE ARTHRALGIA: ICD-10-CM

## 2023-02-08 DIAGNOSIS — M05.80 POLYARTHRITIS WITH POSITIVE RHEUMATOID FACTOR: ICD-10-CM

## 2023-02-08 LAB
25(OH)D3+25(OH)D2 SERPL-MCNC: 83.4 NG/ML (ref 30–100)
ALBUMIN SERPL-MCNC: 5.2 G/DL (ref 3.8–4.9)
ALBUMIN/GLOB SERPL: 1.7 {RATIO} (ref 1.2–2.2)
ALP SERPL-CCNC: 105 IU/L (ref 44–121)
ALT SERPL-CCNC: 14 IU/L (ref 0–32)
APPEARANCE UR: CLEAR
AST SERPL-CCNC: 19 IU/L (ref 0–40)
BACTERIA #/AREA URNS HPF: NORMAL /[HPF]
BASOPHILS # BLD AUTO: 0.1 X10E3/UL (ref 0–0.2)
BASOPHILS NFR BLD AUTO: 1 %
BILIRUB SERPL-MCNC: 0.3 MG/DL (ref 0–1.2)
BILIRUB UR QL STRIP: NEGATIVE
BUN SERPL-MCNC: 26 MG/DL (ref 6–24)
BUN/CREAT SERPL: 21 (ref 9–23)
CALCIUM SERPL-MCNC: 9.8 MG/DL (ref 8.7–10.2)
CCP IGA+IGG SERPL IA-ACNC: 20 UNITS (ref 0–19)
CENTROMERE B AB SER-ACNC: <0.2 AI (ref 0–0.9)
CHLORIDE SERPL-SCNC: 98 MMOL/L (ref 96–106)
CHROMATIN AB SERPL-ACNC: <0.2 AI (ref 0–0.9)
CO2 SERPL-SCNC: 24 MMOL/L (ref 20–29)
COLOR UR: YELLOW
CREAT SERPL-MCNC: 1.21 MG/DL (ref 0.57–1)
CRP SERPL-MCNC: <3 MG/L (ref 0–10)
CRYSTALS URNS MICRO: NORMAL
DSDNA AB SER-ACNC: <1 IU/ML (ref 0–9)
ENA JO1 AB SER-ACNC: <0.2 AI (ref 0–0.9)
ENA RNP AB SER-ACNC: <0.2 AI (ref 0–0.9)
ENA SCL70 AB SER-ACNC: <0.2 AI (ref 0–0.9)
ENA SM AB SER-ACNC: <0.2 AI (ref 0–0.9)
ENA SS-A AB SER-ACNC: <0.2 AI (ref 0–0.9)
ENA SS-B AB SER-ACNC: 1.1 AI (ref 0–0.9)
EOSINOPHIL # BLD AUTO: 0.1 X10E3/UL (ref 0–0.4)
EOSINOPHIL NFR BLD AUTO: 1 %
EPI CELLS #/AREA URNS HPF: NORMAL /HPF (ref 0–10)
ERYTHROCYTE [DISTWIDTH] IN BLOOD BY AUTOMATED COUNT: 12.3 % (ref 11.7–15.4)
EST. GFR  (NO RACE VARIABLE): 52 ML/MIN/1.73
GLOBULIN SER CALC-MCNC: 3 G/DL (ref 1.5–4.5)
GLUCOSE SERPL-MCNC: 96 MG/DL (ref 70–99)
GLUCOSE UR QL STRIP: ABNORMAL
HCT VFR BLD AUTO: 40.1 % (ref 34–46.6)
HGB BLD-MCNC: 13.5 G/DL (ref 11.1–15.9)
HGB UR QL STRIP: NEGATIVE
IMM GRANULOCYTES NFR BLD AUTO: 0 %
IRON SATN MFR SERPL: 20 % (ref 15–55)
IRON SERPL-MCNC: 69 UG/DL (ref 27–159)
KETONES UR QL STRIP: NEGATIVE
LACTATE SERPL-MCNC: 7.3 MG/DL (ref 4.8–25.7)
LEUKOCYTE ESTERASE UR QL STRIP: NEGATIVE
LYMPHOCYTES # BLD AUTO: 1.7 X10E3/UL (ref 0.7–3.1)
LYMPHOCYTES NFR BLD AUTO: 24 %
Lab: ABNORMAL
MCH RBC QN AUTO: 27.7 PG (ref 26.6–33)
MCHC RBC AUTO-ENTMCNC: 33.7 G/DL (ref 31.5–35.7)
MCV RBC AUTO: 82 FL (ref 79–97)
MICRO URNS: ABNORMAL
MICRO URNS: ABNORMAL
MONOCYTES # BLD AUTO: 0.4 X10E3/UL (ref 0.1–0.9)
MONOCYTES NFR BLD AUTO: 6 %
NEUTROPHILS # BLD AUTO: 5 X10E3/UL (ref 1.4–7)
NEUTROPHILS NFR BLD AUTO: 68 %
NITRITE UR QL STRIP: NEGATIVE
PH UR STRIP: 5.5 [PH] (ref 5–7.5)
PHOSPHATE SERPL-MCNC: 4.2 MG/DL (ref 3–4.3)
PLATELET # BLD AUTO: 215 X10E3/UL (ref 150–450)
POTASSIUM SERPL-SCNC: 3.7 MMOL/L (ref 3.5–5.2)
PROT SERPL-MCNC: 8.2 G/DL (ref 6–8.5)
PROT UR QL STRIP: NEGATIVE
RBC # BLD AUTO: 4.87 X10E6/UL (ref 3.77–5.28)
RBC #/AREA URNS HPF: NORMAL /HPF (ref 0–2)
RF IGA SER-ACNC: <7 U
RF IGG SER-ACNC: <7 U
RF IGM SER IA-ACNC: 10 U
SODIUM SERPL-SCNC: 140 MMOL/L (ref 134–144)
SP GR UR STRIP: 1.02 (ref 1–1.03)
SPECIMEN STATUS REPORT: NORMAL
TIBC SERPL-MCNC: 346 UG/DL (ref 250–450)
TSH SERPL DL<=0.005 MIU/L-ACNC: 0.94 UIU/ML (ref 0.45–4.5)
UIBC SERPL-MCNC: 277 UG/DL (ref 131–425)
URATE SERPL-MCNC: 5.5 MG/DL (ref 3–7.2)
URINALYSIS REFLEX: ABNORMAL
UROBILINOGEN UR STRIP-MCNC: 0.2 MG/DL (ref 0.2–1)
VIT B12 SERPL-MCNC: 476 PG/ML (ref 232–1245)
WBC # BLD AUTO: 7.2 X10E3/UL (ref 3.4–10.8)
WBC #/AREA URNS HPF: NORMAL /HPF (ref 0–5)

## 2023-02-09 RX ORDER — PROMETHAZINE HYDROCHLORIDE AND DEXTROMETHORPHAN HYDROBROMIDE 6.25; 15 MG/5ML; MG/5ML
SYRUP ORAL
OUTPATIENT
Start: 2023-02-09

## 2023-02-09 RX ORDER — PROMETHAZINE HYDROCHLORIDE AND DEXTROMETHORPHAN HYDROBROMIDE 6.25; 15 MG/5ML; MG/5ML
SYRUP ORAL
COMMUNITY
Start: 2023-02-02 | End: 2023-05-16

## 2023-02-09 NOTE — TELEPHONE ENCOUNTER
----- Message from Stacey Hendrickson MD sent at 2/8/2023 10:49 AM CST -----  Kidney function is improving, GFR: 52, was 49 previously, goal is >60, needs to increase fluid intake and avoid NSAIDs, recheck CMP in 05/2023. SSB antibody is positive which could indicate lupus or Sjogren's, and Rheumatoid factor/CCP antibody testing is elevated, suggestive of rheumatoid arthritis, all are auto-immune disorders, referral to Rheumatologist for further evaluation and treatment is in file. Remaining labs are essentially normal.

## 2023-02-09 NOTE — TELEPHONE ENCOUNTER
Pt would like her Azithromycin refilled. I looked in her chart and didn't see it anywhere. Please advise. Thanks!

## 2023-02-13 ENCOUNTER — TELEPHONE (OUTPATIENT)
Dept: FAMILY MEDICINE | Facility: CLINIC | Age: 58
End: 2023-02-13
Payer: COMMERCIAL

## 2023-02-13 DIAGNOSIS — E11.65 CONTROLLED TYPE 2 DIABETES MELLITUS WITH HYPERGLYCEMIA, WITHOUT LONG-TERM CURRENT USE OF INSULIN: Primary | ICD-10-CM

## 2023-02-13 RX ORDER — METFORMIN HYDROCHLORIDE 500 MG/1
500 TABLET ORAL 2 TIMES DAILY WITH MEALS
Qty: 180 TABLET | Refills: 3 | Status: SHIPPED | OUTPATIENT
Start: 2023-02-13 | End: 2023-03-15

## 2023-02-13 NOTE — TELEPHONE ENCOUNTER
Pt called to say her Trijardy XR is on back order. She would like to be put back on her metformin that she was on months ago.   Please advise. Thanks!

## 2023-02-13 NOTE — TELEPHONE ENCOUNTER
----- Message from Gordo Duarte sent at 2/13/2023 12:42 PM CST -----  .Type:  Needs Medical Advice    Who Called: Cathie  Symptoms (please be specific):    How long has patient had these symptoms:    Pharmacy name and phone #:  promethazine-dextromethorphan (PROMETHAZINE-DM) 6.25-15 mg/5 mL [facility] is  [facility].   Would the patient rather a call back or a response via [facility]?   Best Call Back Number: [phone]  Additional Information: Please give her a call back about a medication she is currently on. She requested a call back before 4 pm today.

## 2023-02-22 ENCOUNTER — TELEPHONE (OUTPATIENT)
Dept: FAMILY MEDICINE | Facility: CLINIC | Age: 58
End: 2023-02-22
Payer: COMMERCIAL

## 2023-02-22 NOTE — TELEPHONE ENCOUNTER
Called patient to let her know of PCP's protocols. Patient was still confused. Patient reported being on Jardiance, Glucotrol, and Metformin. Pt stated was told by another doctor not to take all three, so is still unsure.    Please advise. Thanks!

## 2023-03-02 ENCOUNTER — TELEPHONE (OUTPATIENT)
Dept: FAMILY MEDICINE | Facility: CLINIC | Age: 58
End: 2023-03-02
Payer: COMMERCIAL

## 2023-03-02 DIAGNOSIS — M25.50 CYCLIC CITRULLINATED PEPTIDE (CCP) ANTIBODY POSITIVE ARTHRALGIA: ICD-10-CM

## 2023-03-02 DIAGNOSIS — R76.8 POSITIVE ANTINUCLEAR ANTIBODY: Primary | ICD-10-CM

## 2023-03-02 DIAGNOSIS — M05.80 POLYARTHRITIS WITH POSITIVE RHEUMATOID FACTOR: ICD-10-CM

## 2023-03-02 NOTE — TELEPHONE ENCOUNTER
----- Message from Aryjonathan Canas sent at 2/28/2023  3:43 PM CST -----  Regarding: advice  Type:  Needs Medical Advice    Who Called: pt  Symptoms (please be specific):    How long has patient had these symptoms:    Pharmacy name and phone #:    Would the patient rather a call back or a response via [facility]?   Best Call Back Number: [phone]  Additional Information: pt called about having to lab work that she just all over again. So she just want to make sure that she that all of the lab work that was ordered is necessary or if she just a couple of them. She stated that she would like to have the orders that she needs to take printed for her to

## 2023-03-02 NOTE — TELEPHONE ENCOUNTER
[facility] for [facility] is back up with referrals and they wont have anything available until June/July. Pt would like her referral sent somewhere else. Please and thanks!

## 2023-03-06 ENCOUNTER — TELEPHONE (OUTPATIENT)
Dept: FAMILY MEDICINE | Facility: CLINIC | Age: 58
End: 2023-03-06
Payer: COMMERCIAL

## 2023-03-06 NOTE — TELEPHONE ENCOUNTER
----- Message from Gordo Duarte sent at 3/6/2023 10:32 AM CST -----  .Type:  Needs Medical Advice    Who Called: Cathie  Symptoms (please be specific):    How long has patient had these symptoms:    Pharmacy name and phone #:    Would the patient rather a call back or a response via [facility]?   Best Call Back Number: [phone]  Additional Information: She needs a call back today as she has fasted and she needs to get her blood work done today.

## 2023-03-07 ENCOUNTER — TELEPHONE (OUTPATIENT)
Dept: FAMILY MEDICINE | Facility: CLINIC | Age: 58
End: 2023-03-07
Payer: COMMERCIAL

## 2023-03-07 LAB
ALBUMIN SERPL-MCNC: 5 G/DL (ref 3.8–4.9)
ALBUMIN/GLOB SERPL: 1.7 {RATIO} (ref 1.2–2.2)
ALP SERPL-CCNC: 103 IU/L (ref 44–121)
ALT SERPL-CCNC: 11 IU/L (ref 0–32)
APPEARANCE UR: CLEAR
AST SERPL-CCNC: 17 IU/L (ref 0–40)
BACTERIA #/AREA URNS HPF: NORMAL /[HPF]
BASOPHILS # BLD AUTO: 0 X10E3/UL (ref 0–0.2)
BASOPHILS NFR BLD AUTO: 1 %
BILIRUB SERPL-MCNC: 0.4 MG/DL (ref 0–1.2)
BILIRUB UR QL STRIP: NEGATIVE
BUN SERPL-MCNC: 17 MG/DL (ref 6–24)
BUN/CREAT SERPL: 18 (ref 9–23)
CALCIUM SERPL-MCNC: 9.8 MG/DL (ref 8.7–10.2)
CHLORIDE SERPL-SCNC: 101 MMOL/L (ref 96–106)
CO2 SERPL-SCNC: 23 MMOL/L (ref 20–29)
COLOR UR: YELLOW
CREAT SERPL-MCNC: 0.95 MG/DL (ref 0.57–1)
CRYSTALS URNS MICRO: NORMAL
EOSINOPHIL # BLD AUTO: 0.1 X10E3/UL (ref 0–0.4)
EOSINOPHIL NFR BLD AUTO: 1 %
EPI CELLS #/AREA URNS HPF: NORMAL /HPF (ref 0–10)
ERYTHROCYTE [DISTWIDTH] IN BLOOD BY AUTOMATED COUNT: 13 % (ref 11.7–15.4)
EST. GFR  (NO RACE VARIABLE): 69 ML/MIN/1.73
GLOBULIN SER CALC-MCNC: 2.9 G/DL (ref 1.5–4.5)
GLUCOSE SERPL-MCNC: 118 MG/DL (ref 70–99)
GLUCOSE UR QL STRIP: NEGATIVE
HCT VFR BLD AUTO: 37.8 % (ref 34–46.6)
HGB BLD-MCNC: 12.3 G/DL (ref 11.1–15.9)
HGB UR QL STRIP: NEGATIVE
IMM GRANULOCYTES NFR BLD AUTO: 0 %
KETONES UR QL STRIP: NEGATIVE
LEUKOCYTE ESTERASE UR QL STRIP: NEGATIVE
LYMPHOCYTES # BLD AUTO: 1.6 X10E3/UL (ref 0.7–3.1)
LYMPHOCYTES NFR BLD AUTO: 29 %
MCH RBC QN AUTO: 26.8 PG (ref 26.6–33)
MCHC RBC AUTO-ENTMCNC: 32.5 G/DL (ref 31.5–35.7)
MCV RBC AUTO: 82 FL (ref 79–97)
MICRO URNS: NORMAL
MICRO URNS: NORMAL
MONOCYTES # BLD AUTO: 0.4 X10E3/UL (ref 0.1–0.9)
MONOCYTES NFR BLD AUTO: 8 %
NEUTROPHILS # BLD AUTO: 3.3 X10E3/UL (ref 1.4–7)
NEUTROPHILS NFR BLD AUTO: 61 %
NITRITE UR QL STRIP: NEGATIVE
PH UR STRIP: 6.5 [PH] (ref 5–7.5)
PLATELET # BLD AUTO: 192 X10E3/UL (ref 150–450)
POTASSIUM SERPL-SCNC: 3.7 MMOL/L (ref 3.5–5.2)
PROT SERPL-MCNC: 7.9 G/DL (ref 6–8.5)
PROT UR QL STRIP: NEGATIVE
RBC # BLD AUTO: 4.59 X10E6/UL (ref 3.77–5.28)
RBC #/AREA URNS HPF: NORMAL /HPF (ref 0–2)
RHEUMATOID FACT SERPL-ACNC: <10 IU/ML
SODIUM SERPL-SCNC: 140 MMOL/L (ref 134–144)
SP GR UR STRIP: 1.01 (ref 1–1.03)
SPECIMEN STATUS REPORT: NORMAL
URINALYSIS REFLEX: NORMAL
UROBILINOGEN UR STRIP-MCNC: 1 MG/DL (ref 0.2–1)
WBC # BLD AUTO: 5.4 X10E3/UL (ref 3.4–10.8)
WBC #/AREA URNS HPF: NORMAL /HPF (ref 0–5)

## 2023-03-07 NOTE — TELEPHONE ENCOUNTER
----- Message from Stacey Hendrickson MD sent at 3/7/2023 11:33 AM CST -----  Labs were reviewed, will discuss results with patient at scheduled office visit on 03/10/2023. Thanks.

## 2023-03-10 ENCOUNTER — OFFICE VISIT (OUTPATIENT)
Dept: FAMILY MEDICINE | Facility: CLINIC | Age: 58
End: 2023-03-10
Payer: COMMERCIAL

## 2023-03-10 ENCOUNTER — CLINICAL SUPPORT (OUTPATIENT)
Dept: FAMILY MEDICINE | Facility: CLINIC | Age: 58
End: 2023-03-10
Payer: COMMERCIAL

## 2023-03-10 VITALS
DIASTOLIC BLOOD PRESSURE: 70 MMHG | WEIGHT: 153.13 LBS | OXYGEN SATURATION: 99 % | HEART RATE: 71 BPM | BODY MASS INDEX: 27.12 KG/M2 | RESPIRATION RATE: 16 BRPM | SYSTOLIC BLOOD PRESSURE: 121 MMHG

## 2023-03-10 DIAGNOSIS — E11.65 CONTROLLED TYPE 2 DIABETES MELLITUS WITH HYPERGLYCEMIA, WITHOUT LONG-TERM CURRENT USE OF INSULIN: ICD-10-CM

## 2023-03-10 DIAGNOSIS — M25.50 CYCLIC CITRULLINATED PEPTIDE (CCP) ANTIBODY POSITIVE ARTHRALGIA: ICD-10-CM

## 2023-03-10 DIAGNOSIS — I15.2 HYPERTENSION ASSOCIATED WITH DIABETES: ICD-10-CM

## 2023-03-10 DIAGNOSIS — E78.5 HYPERLIPIDEMIA ASSOCIATED WITH TYPE 2 DIABETES MELLITUS: ICD-10-CM

## 2023-03-10 DIAGNOSIS — E11.69 HYPERLIPIDEMIA ASSOCIATED WITH TYPE 2 DIABETES MELLITUS: ICD-10-CM

## 2023-03-10 DIAGNOSIS — M05.80 POLYARTHRITIS WITH POSITIVE RHEUMATOID FACTOR: ICD-10-CM

## 2023-03-10 DIAGNOSIS — R76.8 POSITIVE ANTINUCLEAR ANTIBODY: ICD-10-CM

## 2023-03-10 DIAGNOSIS — Z00.00 WELLNESS EXAMINATION: Primary | ICD-10-CM

## 2023-03-10 DIAGNOSIS — E11.59 HYPERTENSION ASSOCIATED WITH DIABETES: ICD-10-CM

## 2023-03-10 PROBLEM — I70.0 ATHEROSCLEROSIS OF ABDOMINAL AORTA: Status: RESOLVED | Noted: 2023-01-31 | Resolved: 2023-03-10

## 2023-03-10 PROCEDURE — 3074F SYST BP LT 130 MM HG: CPT | Mod: CPTII,,, | Performed by: FAMILY MEDICINE

## 2023-03-10 PROCEDURE — 92228 IMG RTA DETC/MNTR DS PHY/QHP: CPT | Mod: TC,,, | Performed by: FAMILY MEDICINE

## 2023-03-10 PROCEDURE — 1160F RVW MEDS BY RX/DR IN RCRD: CPT | Mod: CPTII,,, | Performed by: FAMILY MEDICINE

## 2023-03-10 PROCEDURE — 2025F 7 FLD RTA PHOTO W/O RTNOPTHY: CPT | Mod: CPTII,,, | Performed by: FAMILY MEDICINE

## 2023-03-10 PROCEDURE — 99396 PREV VISIT EST AGE 40-64: CPT | Mod: ,,, | Performed by: FAMILY MEDICINE

## 2023-03-10 PROCEDURE — 3008F BODY MASS INDEX DOCD: CPT | Mod: CPTII,,, | Performed by: FAMILY MEDICINE

## 2023-03-10 PROCEDURE — 4010F ACE/ARB THERAPY RXD/TAKEN: CPT | Mod: CPTII,,, | Performed by: FAMILY MEDICINE

## 2023-03-10 PROCEDURE — 1159F PR MEDICATION LIST DOCUMENTED IN MEDICAL RECORD: ICD-10-PCS | Mod: CPTII,,, | Performed by: FAMILY MEDICINE

## 2023-03-10 PROCEDURE — 1160F PR REVIEW ALL MEDS BY PRESCRIBER/CLIN PHARMACIST DOCUMENTED: ICD-10-PCS | Mod: CPTII,,, | Performed by: FAMILY MEDICINE

## 2023-03-10 PROCEDURE — 2025F PR 7 STANDARD FLD STEREO RETINAL PHOTOS W/O EVID OF RETINOPATHY W/INTERPT BY OPHTH/OPT: ICD-10-PCS | Mod: CPTII,,, | Performed by: FAMILY MEDICINE

## 2023-03-10 PROCEDURE — 3078F PR MOST RECENT DIASTOLIC BLOOD PRESSURE < 80 MM HG: ICD-10-PCS | Mod: CPTII,,, | Performed by: FAMILY MEDICINE

## 2023-03-10 PROCEDURE — 3008F PR BODY MASS INDEX (BMI) DOCUMENTED: ICD-10-PCS | Mod: CPTII,,, | Performed by: FAMILY MEDICINE

## 2023-03-10 PROCEDURE — 92228 DIABETIC EYE SCREENING PHOTO: ICD-10-PCS | Mod: TC,,, | Performed by: FAMILY MEDICINE

## 2023-03-10 PROCEDURE — 3074F PR MOST RECENT SYSTOLIC BLOOD PRESSURE < 130 MM HG: ICD-10-PCS | Mod: CPTII,,, | Performed by: FAMILY MEDICINE

## 2023-03-10 PROCEDURE — 4010F PR ACE/ARB THEARPY RXD/TAKEN: ICD-10-PCS | Mod: CPTII,,, | Performed by: FAMILY MEDICINE

## 2023-03-10 PROCEDURE — 3078F DIAST BP <80 MM HG: CPT | Mod: CPTII,,, | Performed by: FAMILY MEDICINE

## 2023-03-10 PROCEDURE — 99396 PR PREVENTIVE VISIT,EST,40-64: ICD-10-PCS | Mod: ,,, | Performed by: FAMILY MEDICINE

## 2023-03-10 PROCEDURE — 1159F MED LIST DOCD IN RCRD: CPT | Mod: CPTII,,, | Performed by: FAMILY MEDICINE

## 2023-03-10 NOTE — PROGRESS NOTES
Patient ID: [id number]     Chief Complaint: Annual Exam        HPI:     Cathie Santos is a 57 y.o. female here today for annual wellness exam.  Well Adult History   The patient presents for well adult exam. The patient's general health status is described as good. The patient's diet is described as balanced. Exercise: occasional. Associated symptoms consist of denies weight loss, denies weight gain, denies fatigue, denies headache, denies hearing loss and denies vision changes. Last menstrual period: Perimenopausal. Additional pertinent history: last dental exam: goes every 6 months, last eye exam: 2021 (doesn't wear corrective lens, with Dr. Dove, she would like diabetic eye exam), last pap smear:  (WNL with GYN at [facility], Dr. Torres), last MM at [facility], last Cologuard: 2021, seat belt use, daily caffeine use (soft drinks), tobacco use none, social alcohol use and She had labs done on 2023, here to discuss the results today, only had CMP, CBC, and UA. She would not like STD screening. She refuses Tdap, Shingrix today, risks of refusal discussed with patient voicing understanding, but she is UTD on vaccines. DM II is controlled with Rx, no side effects, asymptomatic, she is due for HgA1C. HTN is controlled with Rx, no side effects, asymptomatic. Hypercholesterolemia is controlled with Rx, no side effects, due for FLP, CPK She reports history of anemia, asymptomatic. She has positive SSB, CCP, and RF with polyarthritis, she never heard from an appointment with Rheumatology (Dr. Jade), asymptomatic, needs new referral. She had negative workup with cardiology ([facility]) recently.   -Patient is without any other complaints today.     Advance Care Planning     Date: 03/10/2023    Power of   I initiated the process of advance care planning today and explained the importance of this process to the patient.  I introduced the concept of advance directives to the  patient, as well. Then the patient received detailed information about the importance of designating a Health Care Power of  (HCPOA). She was also instructed to communicate with this person about their wishes for future healthcare, should she become sick and lose decision-making capacity. The patient has previously appointed a HCPOA. After our discussion, the patient has decided to complete a HCPOA and has appointed her significant other, health care agent:  Gilbert Santos  & health care agent number:  [phone]  I spent a total time of 5 minutes discussing this issue with the patient.               ----------------------------  Anemia, unspecified  HTN (hypertension)  Hypercholesterolemia  RUFINO (obstructive sleep apnea)  Type 2 diabetes mellitus without complications  Vitamin D deficiency     Past Surgical History:   Procedure Laterality Date    CHOLECYSTECTOMY         Review of patient's allergies indicates:  No Known Allergies    Outpatient Medications Marked as Taking for the 3/10/23 encounter (Office Visit) with Stacey Hendrickson MD   Medication Sig Dispense Refill    aspirin (ECOTRIN) 81 MG EC tablet Take 81 mg by mouth once daily.      benazepril-hydrochlorthiazide (LOTENSIN HCT) 20-25 mg Tab Take 1 tablet by mouth once daily. 90 tablet 3    ergocalciferol (ERGOCALCIFEROL) 50,000 unit Cap Take 50,000 Int'l Units by mouth every 7 days.      ferrous sulfate 325 (65 FE) MG EC tablet Take 325 mg by mouth 3 (three) times daily with meals.      glipiZIDE (GLUCOTROL) 5 MG TR24 Take 1 tablet (5 mg total) by mouth daily with breakfast. 90 tablet 3    meloxicam (MOBIC) 15 MG tablet Take 1 tablet (15 mg total) by mouth daily as needed for Pain. 90 tablet 3    metFORMIN (GLUCOPHAGE) 500 MG tablet Take 1 tablet (500 mg total) by mouth 2 (two) times daily with meals. 180 tablet 3    promethazine-dextromethorphan (PROMETHAZINE-DM) 6.25-15 mg/5 mL Syrp Take by mouth.      simvastatin (ZOCOR) 80 MG tablet  Take 1 tablet (80 mg total) by mouth nightly. 90 tablet 3    traMADoL (ULTRAM) 50 mg tablet Take 1 tablet (50 mg total) by mouth every 12 (twelve) hours as needed for Pain. 60 tablet 0       Social History     Socioeconomic History    Marital status: Unknown   Tobacco Use    Smoking status: Never    Smokeless tobacco: Never   Substance and Sexual Activity    Alcohol use: Yes    Drug use: Never    Sexual activity: Yes        Family History   Problem Relation Age of Onset    Diabetes Mother     Diabetes Sister     Diabetes Brother         Subjective:       Review of Systems:    See HPI for details    Constitutional: Denies Change in appetite. Denies Chills. Denies Fever. Denies Night sweats.  Eye: Denies Blurred vision. Denies Discharge. Denies Eye pain.  ENT: Denies Decreased hearing. Denies Sore throat. Denies Swollen glands.  Respiratory: Denies Cough. Denies Shortness of breath. Denies Shortness of breath with exertion. Denies Wheezing.  Cardiovascular: Denies Chest pain at rest. Denies Chest pain with exertion. Denies Irregular heartbeat. Denies Palpitations.  Gastrointestinal: Denies Abdominal pain. Denies Diarrhea. Denies Nausea. Denies Vomiting. Denies Hematemesis or Hematochezia.  Genitourinary: Denies Dysuria. Denies Urinary frequency. Denies Urinary urgency. Denies Blood in urine.  Endocrine: Denies Cold intolerance. Denies Excessive thirst. Denies Heat intolerance. Denies Weight loss. Denies Weight gain.  Musculoskeletal: Denies Painful joints. Denies Weakness.  Integumentary: Denies Rash. Denies Itching. Denies Dry skin.  Neurologic: Denies Dizziness. Denies Fainting. Denies Headache.  Psychiatric: Denies Depression. Denies Anxiety. Denies Suicidal/Homicidal ideations.    All Other ROS: Negative except as stated in HPI.       Objective:     /70 (BP Location: Left arm, Patient Position: Sitting, BP Method: Medium (Automatic))   Pulse 71   Resp 16   Wt 69.4 kg (153 lb 1.6 oz)   SpO2 99%   BMI  27.12 kg/m²     Physical Exam    General: Alert and oriented, No acute distress.  Head: Normocephalic, Atraumatic.  Eye: Pupils are equal, round and reactive to light, Extraocular movements are intact, Sclera non-icteric.  Ears/Nose/Throat: Normal, Mucosa moist,Clear.  Neck/Thyroid: Supple, Non-tender, No carotid bruit, No palpable thyromegaly or thyroid nodule, No lymphadenopathy, No JVD, Full range of motion.  Respiratory: Clear to auscultation bilaterally; No wheezes, rales or rhonchi,Non-labored respirations, Symmetrical chest wall expansion.  Cardiovascular: Regular rate and rhythm, S1/S2 normal, No murmurs, rubs or gallops.  Gastrointestinal: Soft, Non-tender, Non-distended, Normal bowel sounds, No palpable organomegaly.  Musculoskeletal: Normal range of motion.  Integumentary: Warm, Dry, Intact, No suspicious lesions or rashes.  Extremities: No clubbing, cyanosis or edema  Protective Sensation (w/ 10 gram monofilament):  Right: Intact  Left: Intact    Visual Inspection:  Normal -  Bilateral    Pedal Pulses:   Right: Present  Left: Present    Posterior Tibialis Pulses:   Right:Present  Left: Present   Neurologic: No focal deficits, Cranial Nerves II-XII are grossly intact, Motor strength normal upper and lower extremities, Sensory exam intact.  Psychiatric: Normal interaction, Coherent speech, Euthymic mood, Appropriate affect     *Lab results from 03/06/2023 were reviewed and discussed with patient and patient voices understanding.*    Assessment:       ICD-10-CM ICD-9-CM   1. Wellness examination  Z00.00 V70.0   2. Controlled type 2 diabetes mellitus with hyperglycemia, without long-term current use of insulin  E11.65 250.80     790.29   3. Hyperlipidemia associated with type 2 diabetes mellitus  E11.69 250.80    E78.5 272.4   4. Hypertension associated with diabetes  E11.59 250.80    I15.2 401.9   5. Polyarthritis with positive rheumatoid factor  M05.80 714.0   6. Cyclic citrullinated peptide (CCP) antibody  positive arthralgia  M25.50 719.40   7. Positive antinuclear antibody  R76.8 795.79        Plan:     Problem List Items Addressed This Visit          Cardiac/Vascular    Hypertension associated with diabetes    Hyperlipidemia associated with type 2 diabetes mellitus    Relevant Orders    Lipid Panel       Immunology/Multi System    Polyarthritis with positive rheumatoid factor    Relevant Orders    Ambulatory referral/consult to Rheumatology    Positive antinuclear antibody    Relevant Orders    Ambulatory referral/consult to Rheumatology       Endocrine    Controlled type 2 diabetes mellitus with hyperglycemia, without long-term current use of insulin    Relevant Orders    Hemoglobin A1C    Diabetic Eye Screening Photo       Other    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    Relevant Orders    Ambulatory referral/consult to Rheumatology     Other Visit Diagnoses       Wellness examination    -  Primary         1. Wellness examination  - Monthly breast self exam encouraged. Diet, exercise, and 10% weight loss encouraged. Keep appointment for dental exams x q6 months as scheduled. Keep appointment for annual eye exam as scheduled. Keep appointment with GYN for annual pap smear as scheduled. Continue followup with specialists as scheduled. Notify M.D. or ER if temp greater than 100.4, or any acute illness.      2. Controlled type 2 diabetes mellitus with hyperglycemia, without long-term current use of insulin  - Hemoglobin A1C; Future  - Diabetic Eye Screening Photo; Future  - DM II is improving. Continue current treatment plan. Recheck CMP, HgA1C in 03/2023. Keep daily fasting CBG log. Keep appointment for annual eye exam as scheduled. Notify M.D. or ER if CBG >400 or <60, polyuria, polydipsia, or polyphagia.             Lab Results   Component Value Date     HGBA1C 8.5 (H) 12/02/2022      Continue   On ACE and Statin according to guidelines.  Follow ADA Diet. Avoid soda, simple sweets, and  limit rice/pasta/breads/starches.  Maintain healthy weight with goal BMI <30.  Exercise 5 times per week for 30 minutes per day.  Stressed importance of daily foot exams.  Stressed importance of annual dilated eye exam.     3. Hyperlipidemia associated with type 2 diabetes mellitus  - Lipid Panel; Future  - Cholesterol is well controlled, LDL: 83, continue current treatment plan, recheck CMP, FLP, CPK in 03/2023.  Continue  Stressed importance of dietary modifications. Follow a low cholesterol, low saturated fat diet with less that 200mg of cholesterol a day.  Avoid fried foods and high saturated fats (high saturated fats less than 7% of calories).  Add Flax Seed/Fish Oil supplements to diet. Increase dietary fiber.  Regular exercise can reduce LDL and raise HDL. Stressed importance of physical activity 5 times per week for 30 minutes per day.    4. Hypertension associated with diabetes  - BP is well controlled, continue BP Rx as prescribed. Continue followup with Cardiology as scheduled. Keep daily BP log. Notify M.D. or ER if BP >170/100 or <90/60, chest pain, palpitations, headache, SOB, temp greater than 100.4, or any acute illness.   Continue  Low Sodium Diet (DASH Diet - Less than 2 grams of sodium per day).  Monitor blood pressure daily and log. Report consistent numbers greater than 140/90.  Smoking cessation encouraged to aid in BP reduction.  Maintain healthy weight with goal BMI <30. Exercise 30 minutes per day, 5 days per week.      5. Polyarthritis with positive rheumatoid factor  - Ambulatory referral/consult to Rheumatology; Future for evaluation and treatment    6. 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  - Ambulatory referral/consult to Rheumatology; Future for evaluation and treatment    7. Positive antinuclear antibody  - Ambulatory referral/consult to Rheumatology; Future for evaluation and treatment        Cathie was seen today for annual exam.    Diagnoses and all orders  for this visit:    Wellness examination    Controlled type 2 diabetes mellitus with hyperglycemia, without long-term current use of insulin  -     Hemoglobin A1C; Future  -     Diabetic Eye Screening Photo; Future    Hyperlipidemia associated with type 2 diabetes mellitus  -     Lipid Panel; Future    Hypertension associated with diabetes    Polyarthritis with positive rheumatoid factor  -     Ambulatory referral/consult to Rheumatology; Future    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  -     Ambulatory referral/consult to Rheumatology; Future    Positive antinuclear antibody  -     Ambulatory referral/consult to Rheumatology; Future          Medication List with Changes/Refills   Current Medications    ASPIRIN (ECOTRIN) 81 MG EC TABLET    Take 81 mg by mouth once daily.       Start Date: --        End Date: --    BENAZEPRIL-HYDROCHLORTHIAZIDE (LOTENSIN HCT) 20-25 MG TAB    Take 1 tablet by mouth once daily.       Start Date: 1/4/2023  End Date: 1/4/2024    ERGOCALCIFEROL (ERGOCALCIFEROL) 50,000 UNIT CAP    Take 50,000 Int'l Units by mouth every 7 days.       Start Date: 2/1/2022  End Date: --    FERROUS SULFATE 325 (65 FE) MG EC TABLET    Take 325 mg by mouth 3 (three) times daily with meals.       Start Date: --        End Date: --    GLIPIZIDE (GLUCOTROL) 5 MG TR24    Take 1 tablet (5 mg total) by mouth daily with breakfast.       Start Date: 7/19/2022 End Date: 7/19/2023    MELOXICAM (MOBIC) 15 MG TABLET    Take 1 tablet (15 mg total) by mouth daily as needed for Pain.       Start Date: 7/6/2022  End Date: 7/6/2023    METFORMIN (GLUCOPHAGE) 500 MG TABLET    Take 1 tablet (500 mg total) by mouth 2 (two) times daily with meals.       Start Date: 2/13/2023 End Date: 2/13/2024    PROMETHAZINE-DEXTROMETHORPHAN (PROMETHAZINE-DM) 6.25-15 MG/5 ML SYRP    Take by mouth.       Start Date: 2/2/2023  End Date: --    SIMVASTATIN (ZOCOR) 80 MG TABLET    Take 1 tablet (80 mg total) by mouth nightly.        Start Date: 1/4/2023  End Date: 1/4/2024    TRAMADOL (ULTRAM) 50 MG TABLET    Take 1 tablet (50 mg total) by mouth every 12 (twelve) hours as needed for Pain.       Start Date: 12/7/2022 End Date: --          Follow up in about 3 months (around 6/10/2023) for Diabetes Followup- 30 minute appointment; * needs records from Cardiology and [facility] from [facility]

## 2023-03-10 NOTE — PROGRESS NOTES
Cathie Santos is a 57 y.o. female here for a diabetic eye screening with non-dilated fundus photos per Dr. Hendrickson.    Patient cooperative?: Yes  Small pupils?: No  Last eye exam: NA    For exam results, see Encounter Report.

## 2023-03-13 ENCOUNTER — TELEPHONE (OUTPATIENT)
Dept: FAMILY MEDICINE | Facility: CLINIC | Age: 58
End: 2023-03-13
Payer: COMMERCIAL

## 2023-03-13 DIAGNOSIS — H35.30 MACULAR DEGENERATION OF BOTH EYES, UNSPECIFIED TYPE: Primary | ICD-10-CM

## 2023-03-13 NOTE — TELEPHONE ENCOUNTER
----- Message from Stacey Hendrickson MD sent at 3/13/2023 10:11 AM CDT -----  Her diabetic eye exam is suggestive of bilateral macular degeneration, she will need to see an eye specialist for evaluation and treatment, referral is in file.

## 2023-03-14 ENCOUNTER — DOCUMENTATION ONLY (OUTPATIENT)
Dept: FAMILY MEDICINE | Facility: CLINIC | Age: 58
End: 2023-03-14
Payer: COMMERCIAL

## 2023-03-14 LAB — HBA1C MFR BLD: 7.7 % (ref 4.8–5.6)

## 2023-03-15 ENCOUNTER — TELEPHONE (OUTPATIENT)
Dept: FAMILY MEDICINE | Facility: CLINIC | Age: 58
End: 2023-03-15
Payer: COMMERCIAL

## 2023-03-15 ENCOUNTER — PATIENT MESSAGE (OUTPATIENT)
Dept: FAMILY MEDICINE | Facility: CLINIC | Age: 58
End: 2023-03-15
Payer: COMMERCIAL

## 2023-03-15 DIAGNOSIS — E11.65 CONTROLLED TYPE 2 DIABETES MELLITUS WITH HYPERGLYCEMIA, WITHOUT LONG-TERM CURRENT USE OF INSULIN: Primary | ICD-10-CM

## 2023-03-15 RX ORDER — EMPAGLIFLOZIN, LINAGLIPTIN, METFORMIN HYDROCHLORIDE 10; 5; 1000 MG/1; MG/1; MG/1
1 TABLET, EXTENDED RELEASE ORAL DAILY
Qty: 90 TABLET | Refills: 3 | Status: SHIPPED | OUTPATIENT
Start: 2023-03-15 | End: 2023-03-28

## 2023-03-15 NOTE — TELEPHONE ENCOUNTER
----- Message from Stacey Hendrickson MD sent at 3/14/2023 11:09 AM CDT -----  Diabetes is improving and near goal, HgA1C: 7.7%, was 8.5%, goal <7.0%, needs ADA diet, exercise, and 10% weight loss, please advise if patient would like to proceed with increasing Metformin dose or changing to Trijardy, recheck CMP, HgA1C in 06/2023.

## 2023-03-24 ENCOUNTER — PATIENT MESSAGE (OUTPATIENT)
Dept: FAMILY MEDICINE | Facility: CLINIC | Age: 58
End: 2023-03-24
Payer: COMMERCIAL

## 2023-03-24 DIAGNOSIS — R82.90 FOUL SMELLING URINE: Primary | ICD-10-CM

## 2023-03-27 ENCOUNTER — PATIENT MESSAGE (OUTPATIENT)
Dept: FAMILY MEDICINE | Facility: CLINIC | Age: 58
End: 2023-03-27
Payer: COMMERCIAL

## 2023-03-27 LAB
APPEARANCE UR: CLEAR
BACTERIA #/AREA URNS HPF: NORMAL /[HPF]
BILIRUB UR QL STRIP: NEGATIVE
COLOR UR: YELLOW
CRYSTALS URNS MICRO: NORMAL
EPI CELLS #/AREA URNS HPF: NORMAL /HPF (ref 0–10)
GLUCOSE UR QL STRIP: NEGATIVE
HGB UR QL STRIP: NEGATIVE
KETONES UR QL STRIP: NEGATIVE
LEUKOCYTE ESTERASE UR QL STRIP: NEGATIVE
MICRO URNS: NORMAL
MICRO URNS: NORMAL
NITRITE UR QL STRIP: NEGATIVE
PH UR STRIP: 6 [PH] (ref 5–7.5)
PROT UR QL STRIP: NEGATIVE
RBC #/AREA URNS HPF: NORMAL /HPF (ref 0–2)
SP GR UR STRIP: 1.01 (ref 1–1.03)
URINALYSIS REFLEX: NORMAL
UROBILINOGEN UR STRIP-MCNC: 0.2 MG/DL (ref 0.2–1)
WBC #/AREA URNS HPF: NORMAL /HPF (ref 0–5)

## 2023-03-28 ENCOUNTER — PATIENT MESSAGE (OUTPATIENT)
Dept: FAMILY MEDICINE | Facility: CLINIC | Age: 58
End: 2023-03-28
Payer: COMMERCIAL

## 2023-03-28 ENCOUNTER — TELEPHONE (OUTPATIENT)
Dept: FAMILY MEDICINE | Facility: CLINIC | Age: 58
End: 2023-03-28
Payer: COMMERCIAL

## 2023-03-28 DIAGNOSIS — E11.65 CONTROLLED TYPE 2 DIABETES MELLITUS WITH HYPERGLYCEMIA, WITHOUT LONG-TERM CURRENT USE OF INSULIN: Primary | ICD-10-CM

## 2023-03-28 RX ORDER — METFORMIN HYDROCHLORIDE 500 MG/1
500 TABLET ORAL 2 TIMES DAILY WITH MEALS
Qty: 180 TABLET | Refills: 3 | Status: SHIPPED | OUTPATIENT
Start: 2023-03-28 | End: 2023-04-03

## 2023-03-28 NOTE — TELEPHONE ENCOUNTER
----- Message from Tamela Allen LPN sent at 3/28/2023  4:10 PM CDT -----  Regarding: FW: tamela needs to call pt  I called to let her know that her UA was clear and she asked about switching her medication. She does not like all the side effects of Trijardy and wants to get back on her Metformin (glucophage) the one that was ordered in December. Please advise. Thanks  ----- Message -----  From: Brianna López  Sent: 3/28/2023  10:43 AM CDT  To: Madhavi FIORE Staff  Subject: ash needs to call pt                       .Type:  Needs Medical Advice    Who Called:  pt    Would the patient rather a call back? yes    Best Call Back Number:  [phone]    Additional Information:  pt wants to talk to Tamela about her medicine

## 2023-04-03 ENCOUNTER — DOCUMENTATION ONLY (OUTPATIENT)
Dept: FAMILY MEDICINE | Facility: CLINIC | Age: 58
End: 2023-04-03
Payer: COMMERCIAL

## 2023-04-03 ENCOUNTER — PATIENT MESSAGE (OUTPATIENT)
Dept: FAMILY MEDICINE | Facility: CLINIC | Age: 58
End: 2023-04-03
Payer: COMMERCIAL

## 2023-04-03 DIAGNOSIS — E11.65 CONTROLLED TYPE 2 DIABETES MELLITUS WITH HYPERGLYCEMIA, WITHOUT LONG-TERM CURRENT USE OF INSULIN: Primary | ICD-10-CM

## 2023-04-03 LAB
LEFT EYE DM RETINOPATHY: NEGATIVE
RIGHT EYE DM RETINOPATHY: NEGATIVE

## 2023-04-03 RX ORDER — METFORMIN HYDROCHLORIDE 500 MG/1
500 TABLET, EXTENDED RELEASE ORAL
Qty: 90 TABLET | Refills: 3 | Status: SHIPPED | OUTPATIENT
Start: 2023-04-03 | End: 2023-06-14

## 2023-04-04 ENCOUNTER — PATIENT MESSAGE (OUTPATIENT)
Dept: FAMILY MEDICINE | Facility: CLINIC | Age: 58
End: 2023-04-04
Payer: COMMERCIAL

## 2023-04-06 ENCOUNTER — PATIENT MESSAGE (OUTPATIENT)
Dept: FAMILY MEDICINE | Facility: CLINIC | Age: 58
End: 2023-04-06
Payer: COMMERCIAL

## 2023-04-06 DIAGNOSIS — E55.9 VITAMIN D DEFICIENCY: Primary | ICD-10-CM

## 2023-04-06 RX ORDER — ERGOCALCIFEROL 1.25 MG/1
50000 CAPSULE ORAL
Qty: 12 CAPSULE | Refills: 0 | Status: SHIPPED | OUTPATIENT
Start: 2023-04-06 | End: 2023-08-21

## 2023-05-16 ENCOUNTER — OFFICE VISIT (OUTPATIENT)
Dept: RHEUMATOLOGY | Facility: CLINIC | Age: 58
End: 2023-05-16
Payer: OTHER GOVERNMENT

## 2023-05-16 VITALS
BODY MASS INDEX: 28 KG/M2 | DIASTOLIC BLOOD PRESSURE: 67 MMHG | HEIGHT: 63 IN | HEART RATE: 67 BPM | WEIGHT: 158 LBS | TEMPERATURE: 99 F | SYSTOLIC BLOOD PRESSURE: 129 MMHG | OXYGEN SATURATION: 98 %

## 2023-05-16 DIAGNOSIS — M25.50 CYCLIC CITRULLINATED PEPTIDE (CCP) ANTIBODY POSITIVE ARTHRALGIA: ICD-10-CM

## 2023-05-16 DIAGNOSIS — M05.80 POLYARTHRITIS WITH POSITIVE RHEUMATOID FACTOR: ICD-10-CM

## 2023-05-16 DIAGNOSIS — G47.00 INSOMNIA, UNSPECIFIED TYPE: ICD-10-CM

## 2023-05-16 DIAGNOSIS — M79.7 FIBROMYALGIA SYNDROME: Primary | ICD-10-CM

## 2023-05-16 DIAGNOSIS — R76.8 POSITIVE ANTINUCLEAR ANTIBODY: ICD-10-CM

## 2023-05-16 PROCEDURE — 99204 PR OFFICE/OUTPT VISIT, NEW, LEVL IV, 45-59 MIN: ICD-10-PCS | Mod: S$PBB,,, | Performed by: INTERNAL MEDICINE

## 2023-05-16 PROCEDURE — 99999 PR PBB SHADOW E&M-EST. PATIENT-LVL IV: CPT | Mod: PBBFAC,,, | Performed by: INTERNAL MEDICINE

## 2023-05-16 PROCEDURE — 99999 PR PBB SHADOW E&M-EST. PATIENT-LVL IV: ICD-10-PCS | Mod: PBBFAC,,, | Performed by: INTERNAL MEDICINE

## 2023-05-16 PROCEDURE — 99204 OFFICE O/P NEW MOD 45 MIN: CPT | Mod: S$PBB,,, | Performed by: INTERNAL MEDICINE

## 2023-05-16 PROCEDURE — 99214 OFFICE O/P EST MOD 30 MIN: CPT | Mod: PBBFAC | Performed by: INTERNAL MEDICINE

## 2023-05-16 RX ORDER — TIZANIDINE 2 MG/1
2 TABLET ORAL NIGHTLY
Qty: 30 TABLET | Refills: 5 | Status: SHIPPED | OUTPATIENT
Start: 2023-05-16 | End: 2023-11-12

## 2023-05-16 RX ORDER — ATORVASTATIN CALCIUM 80 MG/1
1 TABLET, FILM COATED ORAL DAILY
COMMUNITY
Start: 2023-03-13 | End: 2024-03-14 | Stop reason: SDUPTHER

## 2023-05-16 NOTE — PROGRESS NOTES
"Subjective:       Patient ID: Cathie Santos is a 57 y.o. female.    Chief Complaint: Referral (Referral for Polyarthritis and abnormal blood tests. Patient states that her fingers ache at times, feet ache, toes ache, arms get weak at times and have shooting pain in both arms at times. At times she has had stiffness in legs in the mornings. Uncomfortable feeling on the outer part of both legs.  She drops things at times.)    This is a 57-year-old lady referred to me for evaluation of positive SOLOMON, rheumatoid factor, and CCP antibodies.  The patient claims that those tests were done when she had a viral syndrome and that she is not currently having any significant pain.  On my physical exam I realize tenderness over the MCP PIP but the patient denies being in pain and having any morning stiffness.  No fever no chills no others.  The patient is able to sleep but her sleep is interrupted and this has been associated with myalgia above and below the waistline.  The myalgias is 3/10 in intensity dull in quality and continuous.  It is associated with fatigue.  Labs checked during this visit       Review of Systems   Constitutional:  Negative for appetite change, chills and fever.   HENT:  Negative for congestion, ear pain, mouth sores, nosebleeds and trouble swallowing.    Eyes:  Negative for photophobia and discharge.   Respiratory:  Negative for chest tightness and shortness of breath.    Cardiovascular:  Negative for chest pain.   Gastrointestinal:  Negative for abdominal pain and vomiting.   Endocrine: Negative.    Genitourinary:  Negative for hematuria.   Musculoskeletal:         As per HPI   Skin:  Negative for rash.   Neurological:  Negative for weakness.       Objective:   /67 (BP Location: Right arm, Patient Position: Sitting, BP Method: Medium (Automatic))   Pulse 67   Temp 98.7 °F (37.1 °C) (Oral)   Ht 5' 3" (1.6 m)   Wt 71.7 kg (158 lb)   SpO2 98%   BMI 27.99 kg/m²      Physical Exam "   Constitutional: She is oriented to person, place, and time. She appears well-developed and well-nourished. No distress.   HENT:   Head: Normocephalic and atraumatic.   Right Ear: External ear normal.   Left Ear: External ear normal.   Eyes: Pupils are equal, round, and reactive to light.   Cardiovascular: Normal rate, regular rhythm and normal heart sounds.   Pulmonary/Chest: Breath sounds normal.   Abdominal: Soft. There is no abdominal tenderness.   Musculoskeletal:      Right shoulder: Tenderness present.      Left shoulder: Tenderness present.      Right elbow: Tenderness present.      Left elbow: Tenderness present.      Right wrist: Tenderness present.      Left wrist: Tenderness present.      Cervical back: Neck supple.      Right hip: Tenderness present.      Left hip: Tenderness present.      Right knee: Tenderness present.      Left knee: Tenderness present.      Right ankle: Tenderness present.      Left ankle: Tenderness present.   Lymphadenopathy:     She has no cervical adenopathy.   Neurological: She is alert and oriented to person, place, and time. She displays normal reflexes. No cranial nerve deficit or sensory deficit. She exhibits normal muscle tone. Coordination normal.   Skin: No rash noted. No erythema.   Vitals reviewed.      Right Side Rheumatological Exam     The patient is tender to palpation of the shoulder, elbow, wrist, knee, 1st PIP, 1st MCP, 2nd PIP, 2nd MCP, 3rd PIP, 3rd MCP, 4th PIP, 4th MCP, 5th PIP, hip, ankle, 1st MTP, 2nd MTP, 3rd MTP, 4th MTP, 5th MTP, 1st toe IP, 2nd toe IP, 3rd toe IP, 4th toe IP and 5th toe IP    Left Side Rheumatological Exam     The patient is tender to palpation of the shoulder, elbow, wrist, knee, 1st PIP, 1st MCP, 2nd PIP, 2nd MCP, 3rd PIP, 3rd MCP, 4th PIP, 4th MCP, 5th PIP, 5th MCP, hip, ankle, 1st MTP, 2nd MTP, 3rd MTP, 4th MTP, 5th MTP, 1st toe IP, 2nd toe IP, 3rd toe IP, 4th toe IP and 5th toe IP.       Completed Fibromyalgia exam 18/18 tender  points.  No data to display     Assessment:       1. Fibromyalgia syndrome    2. Polyarthritis with positive rheumatoid factor    3. 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 Positive antinuclear antibody    5. Insomnia, unspecified type          Medication List with Changes/Refills   New Medications    TIZANIDINE (ZANAFLEX) 2 MG TABLET    Take 1 tablet (2 mg total) by mouth nightly.       Start Date: 5/16/2023 End Date: 11/12/2023   Current Medications    ASPIRIN (ECOTRIN) 81 MG EC TABLET    Take 81 mg by mouth once daily.       Start Date: --        End Date: --    ATORVASTATIN (LIPITOR) 80 MG TABLET    Take 1 tablet by mouth once daily.       Start Date: 3/13/2023 End Date: --    BENAZEPRIL-HYDROCHLORTHIAZIDE (LOTENSIN HCT) 20-25 MG TAB    Take 1 tablet by mouth once daily.       Start Date: 1/4/2023  End Date: 1/4/2024    ERGOCALCIFEROL (ERGOCALCIFEROL) 50,000 UNIT CAP    Take 1 capsule (50,000 Units total) by mouth every 7 days.       Start Date: 4/6/2023  End Date: --    GLIPIZIDE (GLUCOTROL) 5 MG TR24    Take 1 tablet (5 mg total) by mouth daily with breakfast.       Start Date: 7/19/2022 End Date: 7/19/2023    METFORMIN (GLUCOPHAGE-XR) 500 MG ER 24HR TABLET    Take 1 tablet (500 mg total) by mouth daily with breakfast.       Start Date: 4/3/2023  End Date: 4/2/2024   Discontinued Medications    FERROUS SULFATE 325 (65 FE) MG EC TABLET    Take 325 mg by mouth 3 (three) times daily with meals.       Start Date: --        End Date: 5/16/2023    MELOXICAM (MOBIC) 15 MG TABLET    Take 1 tablet (15 mg total) by mouth daily as needed for Pain.       Start Date: 7/6/2022  End Date: 5/16/2023    PROMETHAZINE-DEXTROMETHORPHAN (PROMETHAZINE-DM) 6.25-15 MG/5 ML SYRP    Take by mouth.       Start Date: 2/2/2023  End Date: 5/16/2023    SIMVASTATIN (ZOCOR) 80 MG TABLET    Take 1 tablet (80 mg total) by mouth nightly.       Start Date: 1/4/2023  End Date: 5/16/2023    TRAMADOL (ULTRAM) 50 MG  TABLET    Take 1 tablet (50 mg total) by mouth every 12 (twelve) hours as needed for Pain.       Start Date: 12/7/2022 End Date: 5/16/2023         Plan:         Problem List Items Addressed This Visit          Immunology/Multi System    Polyarthritis with positive rheumatoid factor    Relevant Medications    tiZANidine (ZANAFLEX) 2 MG tablet    Other Relevant Orders    CBC Auto Differential (Completed)    Comprehensive Metabolic Panel (Completed)    CRP, High Sensitivity (Completed)    Vitamin D (Completed)    Rheumatoid Quantitative (Completed)    Cyclic Citrullinated Peptide Antibody, IgG    Antinuclear Ab, HEp-2 Substrate    Hepatitis B Surface Antigen    Hepatitis C Antibody    TSH (Completed)    T4, Free (Completed)    CK (Completed)    Positive antinuclear antibody    Relevant Medications    tiZANidine (ZANAFLEX) 2 MG tablet    Other Relevant Orders    CBC Auto Differential (Completed)    Comprehensive Metabolic Panel (Completed)    CRP, High Sensitivity (Completed)    Vitamin D (Completed)    Rheumatoid Quantitative (Completed)    Cyclic Citrullinated Peptide Antibody, IgG    Antinuclear Ab, HEp-2 Substrate    Hepatitis B Surface Antigen    Hepatitis C Antibody    TSH (Completed)    T4, Free (Completed)    CK (Completed)       Orthopedic    Fibromyalgia syndrome - Primary    Relevant Medications    tiZANidine (ZANAFLEX) 2 MG tablet    Other Relevant Orders    CBC Auto Differential (Completed)    Comprehensive Metabolic Panel (Completed)    CRP, High Sensitivity (Completed)    Vitamin D (Completed)    Rheumatoid Quantitative (Completed)    Cyclic Citrullinated Peptide Antibody, IgG    Antinuclear Ab, HEp-2 Substrate    Hepatitis B Surface Antigen    Hepatitis C Antibody    TSH (Completed)    T4, Free (Completed)    CK (Completed)       Other    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    Relevant Medications    tiZANidine (ZANAFLEX) 2 MG tablet    Other Relevant Orders    CBC Auto  Differential (Completed)    Comprehensive Metabolic Panel (Completed)    CRP, High Sensitivity (Completed)    Vitamin D (Completed)    Rheumatoid Quantitative (Completed)    Cyclic Citrullinated Peptide Antibody, IgG    Antinuclear Ab, HEp-2 Substrate    Hepatitis B Surface Antigen    Hepatitis C Antibody    TSH (Completed)    T4, Free (Completed)    CK (Completed)    Insomnia    Relevant Medications    tiZANidine (ZANAFLEX) 2 MG tablet    Other Relevant Orders    CBC Auto Differential (Completed)    Comprehensive Metabolic Panel (Completed)    CRP, High Sensitivity (Completed)    Vitamin D (Completed)    Rheumatoid Quantitative (Completed)    Cyclic Citrullinated Peptide Antibody, IgG    Antinuclear Ab, HEp-2 Substrate    Hepatitis B Surface Antigen    Hepatitis C Antibody    TSH (Completed)    T4, Free (Completed)    CK (Completed)

## 2023-06-08 ENCOUNTER — TELEPHONE (OUTPATIENT)
Dept: FAMILY MEDICINE | Facility: CLINIC | Age: 58
End: 2023-06-08

## 2023-06-08 DIAGNOSIS — E78.5 HYPERLIPIDEMIA ASSOCIATED WITH TYPE 2 DIABETES MELLITUS: ICD-10-CM

## 2023-06-08 DIAGNOSIS — E11.69 HYPERLIPIDEMIA ASSOCIATED WITH TYPE 2 DIABETES MELLITUS: ICD-10-CM

## 2023-06-08 DIAGNOSIS — R25.2 MUSCLE CRAMPS: ICD-10-CM

## 2023-06-08 DIAGNOSIS — E11.65 CONTROLLED TYPE 2 DIABETES MELLITUS WITH HYPERGLYCEMIA, WITHOUT LONG-TERM CURRENT USE OF INSULIN: ICD-10-CM

## 2023-06-08 DIAGNOSIS — M79.642 BILATERAL HAND PAIN: ICD-10-CM

## 2023-06-08 DIAGNOSIS — E55.9 VITAMIN D DEFICIENCY: ICD-10-CM

## 2023-06-08 DIAGNOSIS — M79.605 BILATERAL LEG PAIN: ICD-10-CM

## 2023-06-08 DIAGNOSIS — N17.9 AKI (ACUTE KIDNEY INJURY): ICD-10-CM

## 2023-06-08 DIAGNOSIS — M79.641 BILATERAL HAND PAIN: ICD-10-CM

## 2023-06-08 DIAGNOSIS — M79.604 BILATERAL LEG PAIN: ICD-10-CM

## 2023-06-08 NOTE — TELEPHONE ENCOUNTER
----- Message from Anthony Baez sent at 6/8/2023  3:51 PM CDT -----  .Caller is requesting to schedule their Lab appointment prior to annual appointment.  Order is not listed in EPIC.  Please enter order and contact patient to schedule.    Name of Caller:pt    Preferred Date and Time of Labs:6/9/23 in the morning     Date of EPP Appointment:    Where would they like the lab performed?[facility] in [place]     Would the patient rather a call back or a response via [facility]? Call back     Best Call Back Number:[phone]    Additional Information: pt request that you send the labs over to [facility] here in [place]        work

## 2023-06-09 DIAGNOSIS — R25.2 MUSCLE CRAMPS: ICD-10-CM

## 2023-06-09 DIAGNOSIS — E11.69 HYPERLIPIDEMIA ASSOCIATED WITH TYPE 2 DIABETES MELLITUS: ICD-10-CM

## 2023-06-09 DIAGNOSIS — M79.642 BILATERAL HAND PAIN: ICD-10-CM

## 2023-06-09 DIAGNOSIS — E11.65 CONTROLLED TYPE 2 DIABETES MELLITUS WITH HYPERGLYCEMIA, WITHOUT LONG-TERM CURRENT USE OF INSULIN: ICD-10-CM

## 2023-06-09 DIAGNOSIS — R82.90 FOUL SMELLING URINE: ICD-10-CM

## 2023-06-09 DIAGNOSIS — M79.605 BILATERAL LEG PAIN: ICD-10-CM

## 2023-06-09 DIAGNOSIS — M79.641 BILATERAL HAND PAIN: ICD-10-CM

## 2023-06-09 DIAGNOSIS — M79.604 BILATERAL LEG PAIN: ICD-10-CM

## 2023-06-09 DIAGNOSIS — E78.5 HYPERLIPIDEMIA ASSOCIATED WITH TYPE 2 DIABETES MELLITUS: ICD-10-CM

## 2023-06-12 LAB
ALBUMIN SERPL-MCNC: 4.6 G/DL (ref 3.8–4.9)
ALBUMIN/CREAT UR: <8 MG/G CREAT (ref 0–29)
ALBUMIN/GLOB SERPL: 1.4 {RATIO} (ref 1.2–2.2)
ALP SERPL-CCNC: 139 IU/L (ref 44–121)
ALT SERPL-CCNC: 20 IU/L (ref 0–32)
APPEARANCE UR: CLEAR
AST SERPL-CCNC: 17 IU/L (ref 0–40)
BACTERIA #/AREA URNS HPF: ABNORMAL /[HPF]
BACTERIA UR CULT: NORMAL
BACTERIA UR CULT: NORMAL
BASOPHILS # BLD AUTO: 0 X10E3/UL (ref 0–0.2)
BASOPHILS NFR BLD AUTO: 1 %
BILIRUB SERPL-MCNC: 0.4 MG/DL (ref 0–1.2)
BILIRUB UR QL STRIP: NEGATIVE
BUN SERPL-MCNC: 22 MG/DL (ref 6–24)
BUN/CREAT SERPL: 23 (ref 9–23)
CALCIUM SERPL-MCNC: 9.5 MG/DL (ref 8.7–10.2)
CCP IGA+IGG SERPL IA-ACNC: 13 UNITS (ref 0–19)
CHLORIDE SERPL-SCNC: 99 MMOL/L (ref 96–106)
CK SERPL-CCNC: 137 U/L (ref 32–182)
CO2 SERPL-SCNC: 23 MMOL/L (ref 20–29)
COLOR UR: YELLOW
CREAT SERPL-MCNC: 0.94 MG/DL (ref 0.57–1)
CREAT UR-MCNC: 157.3 MG/DL
CRP SERPL-MCNC: <3 MG/L (ref 0–10)
CRYSTALS URNS MICRO: ABNORMAL
EOSINOPHIL # BLD AUTO: 0.1 X10E3/UL (ref 0–0.4)
EOSINOPHIL NFR BLD AUTO: 1 %
EPI CELLS #/AREA URNS HPF: ABNORMAL /HPF (ref 0–10)
ERYTHROCYTE [DISTWIDTH] IN BLOOD BY AUTOMATED COUNT: 12.8 % (ref 11.7–15.4)
EST. GFR  (NO RACE VARIABLE): 70 ML/MIN/1.73
GLOBULIN SER CALC-MCNC: 3.3 G/DL (ref 1.5–4.5)
GLUCOSE SERPL-MCNC: 230 MG/DL (ref 70–99)
GLUCOSE UR QL STRIP: ABNORMAL
HBA1C MFR BLD: 12.2 % (ref 4.8–5.6)
HCT VFR BLD AUTO: 36.9 % (ref 34–46.6)
HGB BLD-MCNC: 12.7 G/DL (ref 11.1–15.9)
HGB UR QL STRIP: NEGATIVE
IMM GRANULOCYTES NFR BLD AUTO: 0 %
IRON SATN MFR SERPL: 25 % (ref 15–55)
IRON SERPL-MCNC: 85 UG/DL (ref 27–159)
KETONES UR QL STRIP: NEGATIVE
LEUKOCYTE ESTERASE UR QL STRIP: ABNORMAL
LYMPHOCYTES # BLD AUTO: 2 X10E3/UL (ref 0.7–3.1)
LYMPHOCYTES NFR BLD AUTO: 31 %
MAGNESIUM SERPL-MCNC: 1.8 MG/DL (ref 1.6–2.3)
MCH RBC QN AUTO: 26.8 PG (ref 26.6–33)
MCHC RBC AUTO-ENTMCNC: 34.4 G/DL (ref 31.5–35.7)
MCV RBC AUTO: 78 FL (ref 79–97)
MICRO URNS: ABNORMAL
MICROALBUMIN UR-MCNC: <12 UG/ML
MONOCYTES # BLD AUTO: 0.5 X10E3/UL (ref 0.1–0.9)
MONOCYTES NFR BLD AUTO: 9 %
MUCOUS THREADS URNS QL MICRO: PRESENT
NEUTROPHILS # BLD AUTO: 3.7 X10E3/UL (ref 1.4–7)
NEUTROPHILS NFR BLD AUTO: 58 %
NITRITE UR QL STRIP: NEGATIVE
PH UR STRIP: 5.5 [PH] (ref 5–7.5)
PHOSPHATE SERPL-MCNC: 3.5 MG/DL (ref 3–4.3)
PLATELET # BLD AUTO: 171 X10E3/UL (ref 150–450)
POTASSIUM SERPL-SCNC: 4.2 MMOL/L (ref 3.5–5.2)
PROT SERPL-MCNC: 7.9 G/DL (ref 6–8.5)
PROT UR QL STRIP: NEGATIVE
RBC # BLD AUTO: 4.73 X10E6/UL (ref 3.77–5.28)
RBC #/AREA URNS HPF: ABNORMAL /HPF (ref 0–2)
SODIUM SERPL-SCNC: 136 MMOL/L (ref 134–144)
SP GR UR STRIP: 1.01 (ref 1–1.03)
SPECIMEN STATUS REPORT: NORMAL
TIBC SERPL-MCNC: 346 UG/DL (ref 250–450)
TSH SERPL DL<=0.005 MIU/L-ACNC: 1.24 UIU/ML (ref 0.45–4.5)
UIBC SERPL-MCNC: 261 UG/DL (ref 131–425)
URATE SERPL-MCNC: 4.9 MG/DL (ref 3–7.2)
URINALYSIS REFLEX: ABNORMAL
UROBILINOGEN UR STRIP-MCNC: 0.2 MG/DL (ref 0.2–1)
VIT B12 SERPL-MCNC: 707 PG/ML (ref 232–1245)
WBC # BLD AUTO: 6.4 X10E3/UL (ref 3.4–10.8)
WBC #/AREA URNS HPF: ABNORMAL /HPF (ref 0–5)

## 2023-06-14 ENCOUNTER — DOCUMENTATION ONLY (OUTPATIENT)
Dept: FAMILY MEDICINE | Facility: CLINIC | Age: 58
End: 2023-06-14

## 2023-06-14 ENCOUNTER — OFFICE VISIT (OUTPATIENT)
Dept: FAMILY MEDICINE | Facility: CLINIC | Age: 58
End: 2023-06-14
Payer: OTHER GOVERNMENT

## 2023-06-14 VITALS
SYSTOLIC BLOOD PRESSURE: 134 MMHG | BODY MASS INDEX: 28.36 KG/M2 | HEART RATE: 74 BPM | OXYGEN SATURATION: 98 % | DIASTOLIC BLOOD PRESSURE: 72 MMHG | WEIGHT: 160.13 LBS

## 2023-06-14 DIAGNOSIS — E11.65 CONTROLLED TYPE 2 DIABETES MELLITUS WITH HYPERGLYCEMIA, WITHOUT LONG-TERM CURRENT USE OF INSULIN: Primary | ICD-10-CM

## 2023-06-14 PROCEDURE — 99215 OFFICE O/P EST HI 40 MIN: CPT | Mod: ,,, | Performed by: FAMILY MEDICINE

## 2023-06-14 PROCEDURE — 99215 PR OFFICE/OUTPT VISIT, EST, LEVL V, 40-54 MIN: ICD-10-PCS | Mod: ,,, | Performed by: FAMILY MEDICINE

## 2023-06-14 RX ORDER — METFORMIN HYDROCHLORIDE 750 MG/1
750 TABLET, EXTENDED RELEASE ORAL
COMMUNITY
End: 2024-03-11

## 2023-06-14 RX ORDER — INSULIN GLARGINE 100 [IU]/ML
10 INJECTION, SOLUTION SUBCUTANEOUS DAILY
COMMUNITY
End: 2024-03-11

## 2023-06-14 NOTE — LETTER
AUTHORIZATION FOR RELEASE OF   CONFIDENTIAL INFORMATION    Dear Dr. Mckeon     We are seeing Cathie Santos, date of birth 1965, in the clinic at [facility]. Stacey Hendrickson MD is the patient's PCP. Cathie Santos has an outstanding lab/procedure at the time we reviewed her chart. In order to help keep her health information updated, she has authorized us to request the following medical record(s):        (  )  MAMMOGRAM                                      (  )  COLONOSCOPY      (  )  PAP SMEAR                                          (  )  OUTSIDE LAB RESULTS     (  )  DEXA SCAN                                          (  )  EYE EXAM            (  )  FOOT EXAM                                          (  x)  ENTIRE RECORD     (  )  OUTSIDE IMMUNIZATIONS                 (  )  _______________         Please fax records to Stacey Hendrickson MD, [phone]     If you have any questions, please contact *** at [phone].           Patient Name: Cathie Santos  : 1965  Patient Phone #: [phone]

## 2023-06-14 NOTE — PROGRESS NOTES
Patient ID: [id number]     Chief Complaint: Diabetes (3 Month Follow Up)        HPI:     Cathie Santos is a 57 y.o. female here today for a follow up DM II.  - Here for followup DM II, she has seen Endocrinology (Dr. Mitchell) yesterday and he changed some of her medications and she is working with him to control her diabetes.   She is asymptomatic.   - She is seeing Rheumatology (Dr. Machado) for SOLOMON positive, had labs done with his office, still awaiting a call from his office for results and plan, she doesn't have an appointment with his office until 08/2023.  - Patient is without any other complaints today.             ----------------------------  Anemia, unspecified  HTN (hypertension)  Hypercholesterolemia  RUFINO (obstructive sleep apnea)  Type 2 diabetes mellitus without complications  Vitamin D deficiency     Past Surgical History:   Procedure Laterality Date    CHOLECYSTECTOMY         Review of patient's allergies indicates:  No Known Allergies    Outpatient Medications Marked as Taking for the 6/14/23 encounter (Office Visit) with Stacey Hendrickson MD   Medication Sig Dispense Refill    aspirin (ECOTRIN) 81 MG EC tablet Take 81 mg by mouth once daily.      atorvastatin (LIPITOR) 80 MG tablet Take 1 tablet by mouth once daily.      benazepril-hydrochlorthiazide (LOTENSIN HCT) 20-25 mg Tab Take 1 tablet by mouth once daily. 90 tablet 3    ergocalciferol (ERGOCALCIFEROL) 50,000 unit Cap Take 1 capsule (50,000 Units total) by mouth every 7 days. 12 capsule 0    glucagon 3 mg/actuation Spry 3 mg by Nasal route once as needed.      insulin glargine 100 units/mL SubQ pen Inject 10 Units into the skin once daily.      metFORMIN (GLUCOPHAGE-XR) 750 MG ER 24hr tablet Take 750 mg by mouth daily with breakfast.      semaglutide (OZEMPIC) 0.25 mg or 0.5 mg (2 mg/3 mL) pen injector Inject 0.25 mg into the skin every 7 days.      tiZANidine (ZANAFLEX) 2 MG tablet Take 1 tablet (2 mg total) by mouth nightly. 30  tablet 5    [DISCONTINUED] glipiZIDE (GLUCOTROL) 5 MG TR24 Take 1 tablet (5 mg total) by mouth daily with breakfast. 90 tablet 3    [DISCONTINUED] metFORMIN (GLUCOPHAGE-XR) 500 MG ER 24hr tablet Take 1 tablet (500 mg total) by mouth daily with breakfast. 90 tablet 3       Social History     Socioeconomic History    Marital status: Unknown   Tobacco Use    Smoking status: Never    Smokeless tobacco: Never   Substance and Sexual Activity    Alcohol use: Yes    Drug use: Never    Sexual activity: Yes        Family History   Problem Relation Age of Onset    Diabetes Mother     Diabetes Sister     Diabetes Brother         Subjective:       Review of Systems:    See HPI for details    Constitutional: Denies Change in appetite. Denies Chills. Denies Fever. Denies Night sweats.  Eye: Denies Blurred vision. Denies Discharge. Denies Eye pain.  ENT: Denies Decreased hearing. Denies Sore throat. Denies Swollen glands.  Respiratory: Denies Cough. Denies Shortness of breath. Denies Shortness of breath with exertion. Denies Wheezing.  Cardiovascular: Denies Chest pain at rest. Denies Chest pain with exertion. Denies Irregular heartbeat. Denies Palpitations.  Gastrointestinal: Denies Abdominal pain. Denies Diarrhea. Denies Nausea. Denies Vomiting. Denies Hematemesis or Hematochezia.  Genitourinary: Denies Dysuria. Denies Urinary frequency. Denies Urinary urgency. Denies Blood in urine.  Endocrine: Denies Cold intolerance. Denies Excessive thirst. Denies Heat intolerance. Denies Weight loss. Denies Weight gain.  Musculoskeletal: Denies Painful joints. Denies Weakness.  Integumentary: Denies Rash. Denies Itching. Denies Dry skin.  Neurologic: Denies Dizziness. Denies Fainting. Denies Headache.  Psychiatric: Denies Depression. Denies Anxiety. Denies Suicidal/Homicidal ideations.    All Other ROS: Negative except as stated in HPI.       Objective:     /72 (BP Location: Right arm, Patient Position: Sitting, BP Method: Medium  (Automatic))   Pulse 74   Wt 72.6 kg (160 lb 1.6 oz)   SpO2 98%   BMI 28.36 kg/m²     Physical Exam    General: Alert and oriented, No acute distress.  Head: Normocephalic, Atraumatic.  Eye: Pupils are equal, round and reactive to light, Extraocular movements are intact, Sclera non-icteric.  Ears/Nose/Throat: Normal, Mucosa moist,Clear.  Neck/Thyroid: Supple, Non-tender, No carotid bruit, No palpable thyromegaly or thyroid nodule, No lymphadenopathy, No JVD, Full range of motion.  Respiratory: Clear to auscultation bilaterally; No wheezes, rales or rhonchi,Non-labored respirations, Symmetrical chest wall expansion.  Cardiovascular: Regular rate and rhythm, S1/S2 normal, No murmurs, rubs or gallops.  Gastrointestinal: Soft, Non-tender, Non-distended, Normal bowel sounds, No palpable organomegaly.  Musculoskeletal: Normal range of motion.  Integumentary: Warm, Dry, Intact, No suspicious lesions or rashes.  Extremities: No clubbing, cyanosis or edema  Neurologic: No focal deficits, Cranial Nerves II-XII are grossly intact, Motor strength normal upper and lower extremities, Sensory exam intact.  Psychiatric: Normal interaction, Coherent speech, Euthymic mood, Appropriate affect.    *Lab results from 06/09/2023 were reviewed and discussed with patient and patient voices understanding.*    Assessment:       ICD-10-CM ICD-9-CM   1. Controlled type 2 diabetes mellitus with hyperglycemia, without long-term current use of insulin  E11.65 250.80     790.29        Plan:     Problem List Items Addressed This Visit          Endocrine    Controlled type 2 diabetes mellitus with hyperglycemia, without long-term current use of insulin - Primary    Relevant Medications    metFORMIN (GLUCOPHAGE-XR) 750 MG ER 24hr tablet    insulin glargine 100 units/mL SubQ pen    semaglutide (OZEMPIC) 0.25 mg or 0.5 mg (2 mg/3 mL) pen injector    1. Controlled type 2 diabetes mellitus with hyperglycemia, without long-term current use of  insulin  - DM II is not controlled, patient is now established with Endocrinologist, continue current treatment plan and followup with Endocrinology as scheduled for DM II management. Keep appointment for annual eye exam as scheduled. Notify M.D. or ER if CBG >400 or <60, polyuria, polydipsia, or polyphagia.   Lab Results   Component Value Date    HGBA1C 12.2 (H) 06/09/2023      Continue   On ACE and Statin according to guidelines.  Follow ADA Diet. Avoid soda, simple sweets, and limit rice/pasta/breads/starches.  Maintain healthy weight with goal BMI <30.  Exercise 5 times per week for 30 minutes per day.  Stressed importance of daily foot exams.  Stressed importance of annual dilated eye exam.       Cathie was seen today for diabetes.    Diagnoses and all orders for this visit:    Controlled type 2 diabetes mellitus with hyperglycemia, without long-term current use of insulin          Medication List with Changes/Refills   Current Medications    ASPIRIN (ECOTRIN) 81 MG EC TABLET    Take 81 mg by mouth once daily.       Start Date: --        End Date: --    ATORVASTATIN (LIPITOR) 80 MG TABLET    Take 1 tablet by mouth once daily.       Start Date: 3/13/2023 End Date: --    BENAZEPRIL-HYDROCHLORTHIAZIDE (LOTENSIN HCT) 20-25 MG TAB    Take 1 tablet by mouth once daily.       Start Date: 1/4/2023  End Date: 1/4/2024    ERGOCALCIFEROL (ERGOCALCIFEROL) 50,000 UNIT CAP    Take 1 capsule (50,000 Units total) by mouth every 7 days.       Start Date: 4/6/2023  End Date: --    GLUCAGON 3 MG/ACTUATION SPRY    3 mg by Nasal route once as needed.       Start Date: --        End Date: --    INSULIN GLARGINE 100 UNITS/ML SUBQ PEN    Inject 10 Units into the skin once daily.       Start Date: --        End Date: --    METFORMIN (GLUCOPHAGE-XR) 750 MG ER 24HR TABLET    Take 750 mg by mouth daily with breakfast.       Start Date: --        End Date: --    SEMAGLUTIDE (OZEMPIC) 0.25 MG OR 0.5 MG (2 MG/3 ML) PEN INJECTOR    Inject 0.25  mg into the skin every 7 days.       Start Date: --        End Date: --    TIZANIDINE (ZANAFLEX) 2 MG TABLET    Take 1 tablet (2 mg total) by mouth nightly.       Start Date: 5/16/2023 End Date: 11/12/2023   Discontinued Medications    GLIPIZIDE (GLUCOTROL) 5 MG TR24    Take 1 tablet (5 mg total) by mouth daily with breakfast.       Start Date: 7/19/2022 End Date: 6/14/2023    METFORMIN (GLUCOPHAGE-XR) 500 MG ER 24HR TABLET    Take 1 tablet (500 mg total) by mouth daily with breakfast.       Start Date: 4/3/2023  End Date: 6/14/2023      Time Documentation:  Spent 46 minutes for visit discussing lab results and her diagnosis. Time was used for billing.       Follow up in about 3 months (around 9/14/2023) for Wellness; *Records from Endocrinology- Dr. Mitchell, Records from Cardiology- Dr. Robbins*.

## 2023-06-14 NOTE — LETTER
AUTHORIZATION FOR RELEASE OF   CONFIDENTIAL INFORMATION    Dear Dr Robbins,    We are seeing Cathie Santos, date of birth 1965, in the clinic at [facility]. Stacey Hendrickson MD is the patient's PCP. Cathie Santos has an outstanding lab/procedure at the time we reviewed her chart. In order to help keep her health information updated, she has authorized us to request the following medical record(s):        (  )  MAMMOGRAM                                      (  )  COLONOSCOPY      (  )  PAP SMEAR                                          (  )  OUTSIDE LAB RESULTS     (  )  DEXA SCAN                                          (  )  EYE EXAM            (  )  FOOT EXAM                                          ( x )  ENTIRE RECORD     (  )  OUTSIDE IMMUNIZATIONS                 (  )  _______________         Please fax records to Stacey Hendrickson MD, [phone]     If you have any questions, please contact office staff at [phone].           Patient Name: Cathie Santos  : 1965  Patient Phone #: [phone]

## 2023-06-14 NOTE — LETTER
AUTHORIZATION FOR RELEASE OF   CONFIDENTIAL INFORMATION    Dear Dr. Robbins,    We are seeing Cathie Santos, date of birth 1965, in the clinic at [facility]. Stacey Hendrickson MD is the patient's PCP. Cathie Santos has an outstanding lab/procedure at the time we reviewed her chart. In order to help keep her health information updated, she has authorized us to request the following medical record(s):        (  )  MAMMOGRAM                                      (  )  COLONOSCOPY      (  )  PAP SMEAR                                          (  )  OUTSIDE LAB RESULTS     (  )  DEXA SCAN                                          (  )  EYE EXAM            (  )  FOOT EXAM                                          ( X)  ENTIRE RECORD     (  )  OUTSIDE IMMUNIZATIONS                 (  )  _______________         Please fax records to Stacey Hendrickson MD, [phone]     If you have any questions, please contact Lory Allen LPN at [phone].           Patient Name: Cathie Santos  : 1965  Patient Phone #: [phone]

## 2023-06-14 NOTE — LETTER
AUTHORIZATION FOR RELEASE OF   CONFIDENTIAL INFORMATION    Dear Dr. Mitchell,    We are seeing Cathie Santos, date of birth 1965, in the clinic at [facility]. Stacey Hendrickson MD is the patient's PCP. Cathie Santos has an outstanding lab/procedure at the time we reviewed her chart. In order to help keep her health information updated, she has authorized us to request the following medical record(s):        (  )  MAMMOGRAM                                      (  )  COLONOSCOPY      (  )  PAP SMEAR                                          (  )  OUTSIDE LAB RESULTS     (  )  DEXA SCAN                                          (  )  EYE EXAM            (  )  FOOT EXAM                                          ( X)  ENTIRE RECORD     (  )  OUTSIDE IMMUNIZATIONS                 (  )  _______________         Please fax records to Stacey Hendrickson MD, [phone]     If you have any questions, please contact Lory Allen LPN at [phone].           Patient Name: Cathie Santos  : 1965  Patient Phone #: [phone]

## 2023-06-29 ENCOUNTER — PATIENT MESSAGE (OUTPATIENT)
Dept: FAMILY MEDICINE | Facility: CLINIC | Age: 58
End: 2023-06-29
Payer: OTHER GOVERNMENT

## 2023-06-29 RX ORDER — BENAZEPRIL/HYDROCHLOROTHIAZIDE 20 MG-25MG
1 TABLET ORAL DAILY
Qty: 90 TABLET | Refills: 3 | Status: SHIPPED | OUTPATIENT
Start: 2023-06-29 | End: 2024-03-11 | Stop reason: SINTOL

## 2023-08-15 ENCOUNTER — OFFICE VISIT (OUTPATIENT)
Dept: RHEUMATOLOGY | Facility: CLINIC | Age: 58
End: 2023-08-15
Payer: OTHER GOVERNMENT

## 2023-08-15 VITALS
DIASTOLIC BLOOD PRESSURE: 78 MMHG | HEART RATE: 72 BPM | BODY MASS INDEX: 27.46 KG/M2 | WEIGHT: 155 LBS | RESPIRATION RATE: 18 BRPM | OXYGEN SATURATION: 98 % | SYSTOLIC BLOOD PRESSURE: 130 MMHG | HEIGHT: 63 IN

## 2023-08-15 DIAGNOSIS — M05.9 SEROPOSITIVE RHEUMATOID ARTHRITIS: ICD-10-CM

## 2023-08-15 DIAGNOSIS — M54.16 BILATERAL LUMBAR RADICULOPATHY: Primary | ICD-10-CM

## 2023-08-15 DIAGNOSIS — M79.7 FIBROMYALGIA SYNDROME: ICD-10-CM

## 2023-08-15 DIAGNOSIS — G47.00 INSOMNIA, UNSPECIFIED TYPE: ICD-10-CM

## 2023-08-15 PROCEDURE — 99999 PR PBB SHADOW E&M-EST. PATIENT-LVL V: ICD-10-PCS | Mod: PBBFAC,,, | Performed by: INTERNAL MEDICINE

## 2023-08-15 PROCEDURE — 99214 OFFICE O/P EST MOD 30 MIN: CPT | Mod: S$PBB,,, | Performed by: INTERNAL MEDICINE

## 2023-08-15 PROCEDURE — 99999 PR PBB SHADOW E&M-EST. PATIENT-LVL V: CPT | Mod: PBBFAC,,, | Performed by: INTERNAL MEDICINE

## 2023-08-15 PROCEDURE — 99215 OFFICE O/P EST HI 40 MIN: CPT | Mod: PBBFAC | Performed by: INTERNAL MEDICINE

## 2023-08-15 PROCEDURE — 99214 PR OFFICE/OUTPT VISIT, EST, LEVL IV, 30-39 MIN: ICD-10-PCS | Mod: S$PBB,,, | Performed by: INTERNAL MEDICINE

## 2023-08-15 RX ORDER — BLOOD SUGAR DIAGNOSTIC
STRIP MISCELLANEOUS 4 TIMES DAILY
COMMUNITY
Start: 2023-06-13

## 2023-08-15 RX ORDER — LANCETS
EACH MISCELLANEOUS
COMMUNITY
Start: 2023-06-15

## 2023-08-15 RX ORDER — HYDROXYCHLOROQUINE SULFATE 200 MG/1
200 TABLET, FILM COATED ORAL 2 TIMES DAILY
Qty: 180 TABLET | Refills: 3 | Status: SHIPPED | OUTPATIENT
Start: 2023-08-15 | End: 2024-03-11

## 2023-08-15 RX ORDER — GLIPIZIDE 5 MG/1
1 TABLET ORAL DAILY
COMMUNITY
Start: 2023-02-13 | End: 2024-03-11

## 2023-08-15 RX ORDER — SIMVASTATIN 80 MG/1
1 TABLET, FILM COATED ORAL NIGHTLY
COMMUNITY
End: 2024-03-11

## 2023-08-15 RX ORDER — FERROUS SULFATE, DRIED 160(50) MG
1 TABLET, EXTENDED RELEASE ORAL DAILY
COMMUNITY

## 2023-08-15 NOTE — PROGRESS NOTES
"Subjective:       Patient ID: Cathie Santos is a 57 y.o. female.    Chief Complaint: Disease Management (3 month follow up )    The patient is complaining of joint pain involving the MCP PIP wrist elbow shoulders hips knees and ankles bilaterally.  The pain is 8/10 in intensity dull in quality and continuous.  That is associated with a morning stiffness lasting for more than 60 minutes.  Is also having difficulty maintaining a good night of sleep.  This has been associated with myalgias.  Muscle aches are 8/10 in intensity dull in quality and continuous.  They are associated with fatigue.  No fever no chills no others.  Labs checked during this visit         Review of Systems   Constitutional:  Negative for appetite change, chills and fever.   HENT:  Negative for congestion, ear pain, mouth sores, nosebleeds and trouble swallowing.    Eyes:  Negative for photophobia and discharge.   Respiratory:  Negative for chest tightness and shortness of breath.    Cardiovascular:  Negative for chest pain.   Gastrointestinal:  Negative for abdominal pain and vomiting.   Endocrine: Negative.    Genitourinary:  Negative for hematuria.   Musculoskeletal:         As per HPI   Skin:  Negative for rash.   Neurological:  Negative for weakness.         Objective:   /78 (BP Location: Right arm, Patient Position: Sitting, BP Method: Medium (Automatic))   Pulse 72   Resp 18   Ht 5' 3" (1.6 m)   Wt 70.3 kg (155 lb)   SpO2 98%   BMI 27.46 kg/m²      Physical Exam   Constitutional: She is oriented to person, place, and time. She appears well-developed and well-nourished. No distress.   HENT:   Head: Normocephalic and atraumatic.   Right Ear: External ear normal.   Left Ear: External ear normal.   Eyes: Pupils are equal, round, and reactive to light.   Cardiovascular: Normal rate, regular rhythm and normal heart sounds.   Pulmonary/Chest: Breath sounds normal.   Abdominal: Soft. There is no abdominal tenderness. "   Musculoskeletal:      Right shoulder: Tenderness present.      Left shoulder: Tenderness present.      Right elbow: Tenderness present.      Left elbow: Tenderness present.      Right wrist: Tenderness present.      Left wrist: Tenderness present.      Cervical back: Neck supple.      Right hip: Tenderness present.      Left hip: Tenderness present.      Right knee: Tenderness present.      Left knee: Tenderness present.      Right ankle: Tenderness present.      Left ankle: Tenderness present.   Lymphadenopathy:     She has no cervical adenopathy.   Neurological: She is alert and oriented to person, place, and time. She displays normal reflexes. No cranial nerve deficit or sensory deficit. She exhibits normal muscle tone. Coordination normal.   Skin: No rash noted. No erythema.   Vitals reviewed.      Right Side Rheumatological Exam     The patient is tender to palpation of the shoulder, elbow, wrist, knee, 1st PIP, 1st MCP, 2nd PIP, 2nd MCP, 3rd PIP, 3rd MCP, 4th PIP, 4th MCP, 5th PIP, hip, ankle, 1st MTP, 2nd MTP, 3rd MTP, 4th MTP, 5th MTP, 1st toe IP, 2nd toe IP, 3rd toe IP, 4th toe IP and 5th toe IP    Left Side Rheumatological Exam     The patient is tender to palpation of the shoulder, elbow, wrist, knee, 1st PIP, 1st MCP, 2nd PIP, 2nd MCP, 3rd PIP, 3rd MCP, 4th PIP, 4th MCP, 5th PIP, 5th MCP, hip, ankle, 1st MTP, 2nd MTP, 3rd MTP, 4th MTP, 5th MTP, 1st toe IP, 2nd toe IP, 3rd toe IP, 4th toe IP and 5th toe IP.         Completed Fibromyalgia exam 18/18 tender points.  No data to display     Assessment:       1. Bilateral lumbar radiculopathy    2. Seropositive rheumatoid arthritis    3. Fibromyalgia syndrome    4. Insomnia, unspecified type          Medication List with Changes/Refills   New Medications    HYDROXYCHLOROQUINE (PLAQUENIL) 200 MG TABLET    Take 1 tablet (200 mg total) by mouth 2 (two) times daily. After food       Start Date: 8/15/2023 End Date: 8/9/2024   Current Medications    ASPIRIN  (ECOTRIN) 81 MG EC TABLET    Take 81 mg by mouth once daily.       Start Date: --        End Date: --    ATORVASTATIN (LIPITOR) 80 MG TABLET    Take 1 tablet by mouth once daily.       Start Date: 3/13/2023 End Date: --    BENAZEPRIL-HYDROCHLORTHIAZIDE (LOTENSIN HCT) 20-25 MG TAB    Take 1 tablet by mouth once daily.       Start Date: 6/29/2023 End Date: 6/28/2024    CALCIUM-VITAMIN D3 (OS-AMADOR 500 + D3) 500 MG-5 MCG (200 UNIT) PER TABLET    Take 1 tablet by mouth once daily.       Start Date: --        End Date: --    CONTOUR NEXT TEST STRIPS STRP    4 (four) times daily.       Start Date: 6/13/2023 End Date: --    ERGOCALCIFEROL (ERGOCALCIFEROL) 50,000 UNIT CAP    Take 1 capsule (50,000 Units total) by mouth every 7 days.       Start Date: 4/6/2023  End Date: --    GLIPIZIDE (GLUCOTROL) 5 MG TABLET    Take 1 tablet by mouth once daily.       Start Date: 2/13/2023 End Date: --    GLUCAGON 3 MG/ACTUATION SPRY    3 mg by Nasal route once as needed.       Start Date: --        End Date: --    INSULIN GLARGINE 100 UNITS/ML SUBQ PEN    Inject 10 Units into the skin once daily.       Start Date: --        End Date: --    METFORMIN (GLUCOPHAGE-XR) 750 MG ER 24HR TABLET    Take 750 mg by mouth daily with breakfast.       Start Date: --        End Date: --    MICROLET LANCET [facility]    USE 1 LANCET TO CHECK GLUCOSE 4 TIMES DAILY       Start Date: 6/15/2023 End Date: --    SEMAGLUTIDE (OZEMPIC) 0.25 MG OR 0.5 MG (2 MG/3 ML) PEN INJECTOR    Inject 0.25 mg into the skin every 7 days.       Start Date: --        End Date: --    SIMVASTATIN (ZOCOR) 80 MG TABLET    Take 1 tablet by mouth every evening.       Start Date: --        End Date: --    TIZANIDINE (ZANAFLEX) 2 MG TABLET    Take 1 tablet (2 mg total) by mouth nightly.       Start Date: 5/16/2023 End Date: 11/12/2023         Plan:         Problem List Items Addressed This Visit          Neuro    Bilateral lumbar radiculopathy - Primary    Relevant Medications     hydrOXYchloroQUINE (PLAQUENIL) 200 mg tablet       Immunology/Multi System    Seropositive rheumatoid arthritis    Relevant Medications    hydrOXYchloroQUINE (PLAQUENIL) 200 mg tablet       Orthopedic    Fibromyalgia syndrome    Relevant Medications    hydrOXYchloroQUINE (PLAQUENIL) 200 mg tablet       Other    Insomnia    Relevant Medications    hydrOXYchloroQUINE (PLAQUENIL) 200 mg tablet

## 2023-08-21 DIAGNOSIS — E55.9 VITAMIN D DEFICIENCY: ICD-10-CM

## 2023-08-21 RX ORDER — ERGOCALCIFEROL 1.25 MG/1
50000 CAPSULE ORAL
Qty: 12 CAPSULE | Refills: 0 | Status: SHIPPED | OUTPATIENT
Start: 2023-08-21

## 2023-08-21 NOTE — TELEPHONE ENCOUNTER
----- Message from April Stewart sent at 8/21/2023 10:29 AM CDT -----  Regarding: med advice  .Type:  Needs Medical Advice    Who Called:  patient  Symptoms (please be specific):    How long has patient had these symptoms:    Pharmacy name and phone #:    Would the patient rather a call back or a response via [facility]? Call back  Best Call Back Number:  [phone]  Additional Information: patient is requesting a call back concerning her vitamin D. She is requesting a refill but stated the wrong one is listed in her portal. Please advise.

## 2023-10-19 ENCOUNTER — PATIENT MESSAGE (OUTPATIENT)
Dept: FAMILY MEDICINE | Facility: CLINIC | Age: 58
End: 2023-10-19
Payer: OTHER GOVERNMENT

## 2023-10-25 ENCOUNTER — TELEPHONE (OUTPATIENT)
Dept: FAMILY MEDICINE | Facility: CLINIC | Age: 58
End: 2023-10-25
Payer: OTHER GOVERNMENT

## 2023-10-25 NOTE — TELEPHONE ENCOUNTER
Pt was stating her appt on 6/14/2023 was supposed to be a wellness and I informed her she had a wellness in March and unfortunately she can only have one wellness every 366 days. She voiced understanding

## 2023-10-25 NOTE — TELEPHONE ENCOUNTER
----- Message from Vitor Shanks sent at 10/25/2023  2:40 PM CDT -----  .Type:  Needs Medical Advice    Who Called: pt  Symptoms (please be specific):    How long has patient had these symptoms:    Pharmacy name and phone #:    Would the patient rather a call back or a response via [facility]? Call back  Best Call Back Number: [phone]  Additional Information: pt stated she spoke with billing about a bill and was told the visit was coded incorrectly and to contact the clinic

## 2024-03-04 ENCOUNTER — TELEPHONE (OUTPATIENT)
Dept: FAMILY MEDICINE | Facility: CLINIC | Age: 59
End: 2024-03-04
Payer: OTHER GOVERNMENT

## 2024-03-04 DIAGNOSIS — Z00.00 BLOOD TESTS FOR ROUTINE GENERAL PHYSICAL EXAMINATION: Primary | ICD-10-CM

## 2024-03-04 DIAGNOSIS — E11.65 CONTROLLED TYPE 2 DIABETES MELLITUS WITH HYPERGLYCEMIA, WITHOUT LONG-TERM CURRENT USE OF INSULIN: ICD-10-CM

## 2024-03-04 NOTE — TELEPHONE ENCOUNTER
Are there any outstanding tasks in the patients's chart (ex.labs,MM,etc)?  no  Do we have outstanding/pending referrals?  no  Has the patient been seen in and ER,UCC, or been admitted since last visit?  no  Has the patient seen any other health care provider(doctors) since last visit?  Yes, Rishi Mitchell MD  Has the patient had any bloodwork or x-rays done since last visit?   No    Pts metrics are up to date

## 2024-03-04 NOTE — LETTER
AUTHORIZATION FOR RELEASE OF   CONFIDENTIAL INFORMATION    Dear Dr Mitchell,    We are seeing Cathie Santos, date of birth 1965, in the clinic at [facility] (formerly [facility]3]. Stacey Hendrickson MD is the patient's PCP. Cathie Santos has an outstanding lab/procedure at the time we reviewed her chart. In order to help keep her health information updated, she has authorized us to request the following medical record(s):        (  )  MAMMOGRAM                                      (  )  COLONOSCOPY      (  )  PAP SMEAR                                          (  )  OUTSIDE LAB RESULTS     (  )  DEXA SCAN                                          (  )  EYE EXAM            (  )  FOOT EXAM                                          (  )  ENTIRE RECORD     (  )  OUTSIDE IMMUNIZATIONS                 ( X )  __last A1C__       Please fax records to Stacey Hendrickson MD, [phone]     If you have any questions, please contact Lory Allen LPN at [phone].           Patient Name: Cathie Santos  : 1965  Patient Phone #: [phone]

## 2024-03-06 ENCOUNTER — TELEPHONE (OUTPATIENT)
Dept: FAMILY MEDICINE | Facility: CLINIC | Age: 59
End: 2024-03-06
Payer: OTHER GOVERNMENT

## 2024-03-06 NOTE — TELEPHONE ENCOUNTER
----- Message from Juany Almaguer sent at 3/6/2024 12:09 PM CST -----  Regarding: lab orders  .Caller is requesting to schedule their Lab appointment prior to annual appointment.  Order is not listed in EPIC.  Please enter order and contact patient to schedule.    Name of Caller:Pt      Preferred Date and Time of Labs:NOW    Date of EPP Appointment:unknown    [facility]    Would the patient rather a call back or a response via [facility]? JOHANNA    Best Call Back Number:[phone]      Additional Information:pt is currently at [facility]; she said that she spoke with Lory on yesterday and was informed that her orders would be faxed over; [facility] is stating that they have not received any orders; fax#: [phone]; phone #: [phone]

## 2024-03-07 LAB
25(OH)D3+25(OH)D2 SERPL-MCNC: 33.9 NG/ML (ref 30–100)
ALBUMIN SERPL-MCNC: 4.5 G/DL (ref 3.8–4.9)
ALBUMIN/CREAT UR: <20 MG/G CREAT (ref 0–29)
ALBUMIN/GLOB SERPL: 1.6 {RATIO} (ref 1.2–2.2)
ALP SERPL-CCNC: 99 IU/L (ref 44–121)
ALT SERPL-CCNC: 20 IU/L (ref 0–32)
APPEARANCE UR: CLEAR
AST SERPL-CCNC: 20 IU/L (ref 0–40)
BACTERIA #/AREA URNS HPF: NORMAL /[HPF]
BASOPHILS # BLD AUTO: 0.1 X10E3/UL (ref 0–0.2)
BASOPHILS NFR BLD AUTO: 1 %
BILIRUB SERPL-MCNC: 0.3 MG/DL (ref 0–1.2)
BILIRUB UR QL STRIP: NEGATIVE
BUN SERPL-MCNC: 21 MG/DL (ref 6–24)
BUN/CREAT SERPL: 20 (ref 9–23)
CALCIUM SERPL-MCNC: 9.6 MG/DL (ref 8.7–10.2)
CHLORIDE SERPL-SCNC: 102 MMOL/L (ref 96–106)
CHOLEST SERPL-MCNC: 148 MG/DL (ref 100–199)
CO2 SERPL-SCNC: 25 MMOL/L (ref 20–29)
COLOR UR: YELLOW
CREAT SERPL-MCNC: 1.05 MG/DL (ref 0.57–1)
CREAT UR-MCNC: 58.8 MG/DL
CRYSTALS URNS MICRO: NORMAL
EOSINOPHIL # BLD AUTO: 0.1 X10E3/UL (ref 0–0.4)
EOSINOPHIL NFR BLD AUTO: 2 %
EPI CELLS #/AREA URNS HPF: NORMAL /HPF (ref 0–10)
ERYTHROCYTE [DISTWIDTH] IN BLOOD BY AUTOMATED COUNT: 13.3 % (ref 11.7–15.4)
EST. GFR  (NO RACE VARIABLE): 61 ML/MIN/1.73
GLOBULIN SER CALC-MCNC: 2.8 G/DL (ref 1.5–4.5)
GLUCOSE SERPL-MCNC: 102 MG/DL (ref 70–99)
GLUCOSE UR QL STRIP: NEGATIVE
HCT VFR BLD AUTO: 35.2 % (ref 34–46.6)
HDLC SERPL-MCNC: 71 MG/DL
HGB BLD-MCNC: 11.6 G/DL (ref 11.1–15.9)
HGB UR QL STRIP: NEGATIVE
IMM GRANULOCYTES NFR BLD AUTO: 0 %
KETONES UR QL STRIP: NEGATIVE
LDLC SERPL CALC-MCNC: 68 MG/DL (ref 0–99)
LEUKOCYTE ESTERASE UR QL STRIP: ABNORMAL
LYMPHOCYTES # BLD AUTO: 2.2 X10E3/UL (ref 0.7–3.1)
LYMPHOCYTES NFR BLD AUTO: 31 %
MCH RBC QN AUTO: 27 PG (ref 26.6–33)
MCHC RBC AUTO-ENTMCNC: 33 G/DL (ref 31.5–35.7)
MCV RBC AUTO: 82 FL (ref 79–97)
MICRO URNS: ABNORMAL
MICROALBUMIN UR-MCNC: <12 UG/ML
MONOCYTES # BLD AUTO: 0.6 X10E3/UL (ref 0.1–0.9)
MONOCYTES NFR BLD AUTO: 8 %
NEUTROPHILS # BLD AUTO: 4.1 X10E3/UL (ref 1.4–7)
NEUTROPHILS NFR BLD AUTO: 58 %
NITRITE UR QL STRIP: NEGATIVE
PH UR STRIP: 7.5 [PH] (ref 5–7.5)
PLATELET # BLD AUTO: 204 X10E3/UL (ref 150–450)
POTASSIUM SERPL-SCNC: 4.4 MMOL/L (ref 3.5–5.2)
PROT SERPL-MCNC: 7.3 G/DL (ref 6–8.5)
PROT UR QL STRIP: NEGATIVE
RBC # BLD AUTO: 4.29 X10E6/UL (ref 3.77–5.28)
RBC #/AREA URNS HPF: NORMAL /HPF (ref 0–2)
SODIUM SERPL-SCNC: 141 MMOL/L (ref 134–144)
SP GR UR STRIP: 1.01 (ref 1–1.03)
SPECIMEN STATUS REPORT: NORMAL
TRIGL SERPL-MCNC: 35 MG/DL (ref 0–149)
TSH SERPL DL<=0.005 MIU/L-ACNC: 1.51 UIU/ML (ref 0.45–4.5)
UROBILINOGEN UR STRIP-MCNC: 0.2 MG/DL (ref 0.2–1)
VLDLC SERPL CALC-MCNC: 9 MG/DL (ref 5–40)
WBC # BLD AUTO: 7 X10E3/UL (ref 3.4–10.8)
WBC #/AREA URNS HPF: NORMAL /HPF (ref 0–5)

## 2024-03-11 ENCOUNTER — OFFICE VISIT (OUTPATIENT)
Dept: FAMILY MEDICINE | Facility: CLINIC | Age: 59
End: 2024-03-11
Payer: OTHER GOVERNMENT

## 2024-03-11 VITALS
SYSTOLIC BLOOD PRESSURE: 114 MMHG | WEIGHT: 156.88 LBS | HEART RATE: 80 BPM | BODY MASS INDEX: 27.8 KG/M2 | OXYGEN SATURATION: 98 % | HEIGHT: 63 IN | DIASTOLIC BLOOD PRESSURE: 66 MMHG

## 2024-03-11 DIAGNOSIS — E11.65 CONTROLLED TYPE 2 DIABETES MELLITUS WITH HYPERGLYCEMIA, WITHOUT LONG-TERM CURRENT USE OF INSULIN: ICD-10-CM

## 2024-03-11 DIAGNOSIS — Z23 NEED FOR PNEUMOCOCCAL 20-VALENT CONJUGATE VACCINATION: ICD-10-CM

## 2024-03-11 DIAGNOSIS — Z00.00 WELLNESS EXAMINATION: Primary | ICD-10-CM

## 2024-03-11 DIAGNOSIS — M05.80 POLYARTHRITIS WITH POSITIVE RHEUMATOID FACTOR: ICD-10-CM

## 2024-03-11 DIAGNOSIS — E11.59 HYPERTENSION ASSOCIATED WITH DIABETES: ICD-10-CM

## 2024-03-11 DIAGNOSIS — Z23 NEED FOR SHINGLES VACCINE: ICD-10-CM

## 2024-03-11 DIAGNOSIS — Z12.31 VISIT FOR SCREENING MAMMOGRAM: ICD-10-CM

## 2024-03-11 DIAGNOSIS — Z12.11 SCREENING FOR COLON CANCER: ICD-10-CM

## 2024-03-11 DIAGNOSIS — E78.5 HYPERLIPIDEMIA ASSOCIATED WITH TYPE 2 DIABETES MELLITUS: ICD-10-CM

## 2024-03-11 DIAGNOSIS — E11.69 HYPERLIPIDEMIA ASSOCIATED WITH TYPE 2 DIABETES MELLITUS: ICD-10-CM

## 2024-03-11 DIAGNOSIS — I15.2 HYPERTENSION ASSOCIATED WITH DIABETES: ICD-10-CM

## 2024-03-11 PROCEDURE — 90750 HZV VACC RECOMBINANT IM: CPT | Mod: ,,, | Performed by: FAMILY MEDICINE

## 2024-03-11 PROCEDURE — 90472 IMMUNIZATION ADMIN EACH ADD: CPT | Mod: ,,, | Performed by: FAMILY MEDICINE

## 2024-03-11 PROCEDURE — 90677 PCV20 VACCINE IM: CPT | Mod: ,,, | Performed by: FAMILY MEDICINE

## 2024-03-11 PROCEDURE — 99396 PREV VISIT EST AGE 40-64: CPT | Mod: 25,,, | Performed by: FAMILY MEDICINE

## 2024-03-11 PROCEDURE — 90471 IMMUNIZATION ADMIN: CPT | Mod: ,,, | Performed by: FAMILY MEDICINE

## 2024-03-11 RX ORDER — GLIPIZIDE 10 MG/1
10 TABLET, FILM COATED, EXTENDED RELEASE ORAL 2 TIMES DAILY
COMMUNITY
Start: 2024-01-31

## 2024-03-11 RX ORDER — METFORMIN HYDROCHLORIDE 750 MG/1
750 TABLET, EXTENDED RELEASE ORAL 2 TIMES DAILY WITH MEALS
COMMUNITY

## 2024-03-11 RX ORDER — OLMESARTAN MEDOXOMIL AND HYDROCHLOROTHIAZIDE 20/12.5 20; 12.5 MG/1; MG/1
1 TABLET ORAL DAILY
Qty: 30 TABLET | Refills: 2 | Status: SHIPPED | OUTPATIENT
Start: 2024-03-11 | End: 2024-06-09

## 2024-03-11 NOTE — LETTER
AUTHORIZATION FOR RELEASE OF   CONFIDENTIAL INFORMATION    Dear DR Mitchell,    We are seeing Cathie Santos, date of birth 1965, in the clinic at [facility]. Stacey Hendrickson MD is the patient's PCP. Cathie Santos has an outstanding lab/procedure at the time we reviewed her chart. In order to help keep her health information updated, she has authorized us to request the following medical record(s):        (  )  MAMMOGRAM                                      (  )  COLONOSCOPY      (  )  PAP SMEAR                                          (  )  OUTSIDE LAB RESULTS     (  )  DEXA SCAN                                          (  )  EYE EXAM            (  )  FOOT EXAM                                          (  )  ENTIRE RECORD     (  )  OUTSIDE IMMUNIZATIONS                 ( X )  __last A1C_________         Please fax records to Stacey Hendrickson MD, [phone]     If you have any questions, please contact Lory Allen LPN at [phone].           Patient Name: Cathie Santos  : 1965  Patient Phone #: [phone]

## 2024-03-14 DIAGNOSIS — E11.69 HYPERLIPIDEMIA ASSOCIATED WITH TYPE 2 DIABETES MELLITUS: Primary | ICD-10-CM

## 2024-03-14 DIAGNOSIS — E78.5 HYPERLIPIDEMIA ASSOCIATED WITH TYPE 2 DIABETES MELLITUS: Primary | ICD-10-CM

## 2024-03-14 RX ORDER — ATORVASTATIN CALCIUM 80 MG/1
80 TABLET, FILM COATED ORAL DAILY
Qty: 90 TABLET | Refills: 3 | Status: SHIPPED | OUTPATIENT
Start: 2024-03-14

## 2024-03-21 ENCOUNTER — TELEPHONE (OUTPATIENT)
Dept: FAMILY MEDICINE | Facility: CLINIC | Age: 59
End: 2024-03-21
Payer: OTHER GOVERNMENT

## 2024-03-21 NOTE — TELEPHONE ENCOUNTER
----- Message from Vitor Shanks sent at 3/21/2024 10:52 AM CDT -----  .Type:  Needs Medical Advice    Who Called: pt  Symptoms (please be specific):    How long has patient had these symptoms:    Pharmacy name and phone #:    Would the patient rather a call back or a response via [facility]? Call back  Best Call Back Number: [phone]  Additional Information: pt calling about appt 03/11/24 paperwork

## 2024-05-27 ENCOUNTER — TELEPHONE (OUTPATIENT)
Dept: FAMILY MEDICINE | Facility: CLINIC | Age: 59
End: 2024-05-27
Payer: OTHER GOVERNMENT

## 2024-05-27 NOTE — TELEPHONE ENCOUNTER
----- Message from Malissa Blaine sent at 5/24/2024  3:22 PM CDT -----  Regarding: medical advice  Type:  Needs Medical Advice    Who Called: pt   Symptoms (please be specific): none   How long has patient had these symptoms:  none  Pharmacy name and phone #:  n/a   Would the patient rather a call back or a response via [facility]? C/b   Best Call Back Number: [phone]  Additional Information: pt is calling because she is confused about why her appt is so far away when she is supposed to come in every 3mths for her shingles. Pt also stated that she needed blood work and she does not have the orders for that. Please call to advise.

## 2024-05-27 NOTE — TELEPHONE ENCOUNTER
----- Message from Karla Minor sent at 5/27/2024 12:04 PM CDT -----  Regarding: Patient Call  .Who Called: Cathie Santos    Patient is returning phone call    Who Left Message for Patient:office staff-Lory  Does the patient know what this is regarding?:return/missed call       Preferred Method of Contact: Phone Call  Patient's Preferred Phone Number on File: [phone]   Best Call Back Number, if different:  Additional Information: pt return call

## 2024-06-24 ENCOUNTER — TELEPHONE (OUTPATIENT)
Dept: FAMILY MEDICINE | Facility: CLINIC | Age: 59
End: 2024-06-24
Payer: OTHER GOVERNMENT

## 2024-06-24 DIAGNOSIS — M79.605 BILATERAL LEG PAIN: Primary | ICD-10-CM

## 2024-06-24 DIAGNOSIS — M79.604 BILATERAL LEG PAIN: Primary | ICD-10-CM

## 2024-06-24 NOTE — TELEPHONE ENCOUNTER
Pt is requesting a referral to a vein specialist, she is having cramping in her legs for the past couple of weeks. She states she has increased her hydration and that isn't helping. Please advise Thanks

## 2024-06-25 ENCOUNTER — TELEPHONE (OUTPATIENT)
Dept: FAMILY MEDICINE | Facility: CLINIC | Age: 59
End: 2024-06-25
Payer: OTHER GOVERNMENT

## 2024-06-25 NOTE — TELEPHONE ENCOUNTER
----- Message from Tanvi Wheeler sent at 6/25/2024  4:12 PM CDT -----  Regarding: advice  Who Called: Cathie Washington    Caller is requesting assistance/information from provider's office.    Symptoms (please be specific):    How long has patient had these symptoms:    List of preferred pharmacies on file (remove unneeded): [unfilled]  If different, enter pharmacy into here including location and phone number:       Preferred Method of Contact: Phone Call  Patient's Preferred Phone Number on File: [phone]   Best Call Back Number, if different:  Additional Information: Pt is requesting a call back from Dr Hendrickson about the knee pain, stated she did speak with nurse and was given a referral. When she called office they told her she was in their system and also did not she a referral for the pt. Please advise.

## 2024-06-25 NOTE — TELEPHONE ENCOUNTER
----- Message from Tanvi Wheeler sent at 6/25/2024  2:34 PM CDT -----  Regarding: med advice  Who Called: Cathie Washington    Caller is requesting assistance/information from provider's office.    Symptoms (please be specific): knee pain   How long has patient had these symptoms:    List of preferred pharmacies on file (remove unneeded): [unfilled]  If different, enter pharmacy into here including location and phone number:       Preferred Method of Contact: Phone Call  Patient's Preferred Phone Number on File: [phone]   Best Call Back Number, if different:  Additional Information: Pt is requesting a phone call in regards to knee pain, pt has some questions in regards to side effects of vaccinations given.

## 2024-06-25 NOTE — TELEPHONE ENCOUNTER
----- Message from Tanvi Wheeler sent at 6/25/2024  2:34 PM CDT -----  Regarding: med advice  Who Called: Cathie Washington    Caller is requesting assistance/information from provider's office.    Symptoms (please be specific): knee pain   How long has patient had these symptoms:    List of preferred pharmacies on file (remove unneeded): [unfilled]  If different, enter pharmacy into here including location and phone number:       Preferred Method of Contact: Phone Call  Patient's Preferred Phone Number on File: [phone]   Best Call Back Number, if different:  Additional Information: Pt is requesting a phone call in regards to knee pain, pt has some questions in regards to side effects of vaccinations given.

## 2024-09-04 ENCOUNTER — TELEPHONE (OUTPATIENT)
Dept: FAMILY MEDICINE | Facility: CLINIC | Age: 59
End: 2024-09-04
Payer: OTHER GOVERNMENT

## 2024-09-04 NOTE — TELEPHONE ENCOUNTER
Are there any outstanding tasks in the patients's chart (ex.labs,MM,etc)?  yes  Do we have outstanding/pending referrals?  yes  Has the patient been seen in and ER,UCC, or been admitted since last visit?  yes  Has the patient seen any other health care provider(doctors) since last visit?  no  Has the patient had any bloodwork or x-rays done since last visit?  yes

## 2024-09-09 LAB
ALBUMIN SERPL-MCNC: 4.3 G/DL (ref 3.8–4.9)
ALP SERPL-CCNC: 82 IU/L (ref 44–121)
ALT SERPL-CCNC: 20 IU/L (ref 0–32)
AST SERPL-CCNC: 19 IU/L (ref 0–40)
BASOPHILS # BLD AUTO: 0.1 X10E3/UL (ref 0–0.2)
BASOPHILS NFR BLD AUTO: 1 %
BILIRUB SERPL-MCNC: 0.4 MG/DL (ref 0–1.2)
BUN SERPL-MCNC: 19 MG/DL (ref 6–24)
BUN/CREAT SERPL: 19 (ref 9–23)
CALCIUM SERPL-MCNC: 9.5 MG/DL (ref 8.7–10.2)
CHLORIDE SERPL-SCNC: 104 MMOL/L (ref 96–106)
CHOLEST SERPL-MCNC: 150 MG/DL (ref 100–199)
CK SERPL-CCNC: 137 U/L (ref 32–182)
CO2 SERPL-SCNC: 24 MMOL/L (ref 20–29)
CREAT SERPL-MCNC: 1.02 MG/DL (ref 0.57–1)
EOSINOPHIL # BLD AUTO: 0.1 X10E3/UL (ref 0–0.4)
EOSINOPHIL NFR BLD AUTO: 1 %
ERYTHROCYTE [DISTWIDTH] IN BLOOD BY AUTOMATED COUNT: 13.6 % (ref 11.7–15.4)
EST. GFR  (NO RACE VARIABLE): 63 ML/MIN/1.73
GLOBULIN SER CALC-MCNC: 3 G/DL (ref 1.5–4.5)
GLUCOSE SERPL-MCNC: 60 MG/DL (ref 70–99)
HCT VFR BLD AUTO: 34.2 % (ref 34–46.6)
HDLC SERPL-MCNC: 66 MG/DL
HGB BLD-MCNC: 11.3 G/DL (ref 11.1–15.9)
IMM GRANULOCYTES NFR BLD AUTO: 1 %
LDLC SERPL CALC-MCNC: 75 MG/DL (ref 0–99)
LYMPHOCYTES # BLD AUTO: 2 X10E3/UL (ref 0.7–3.1)
LYMPHOCYTES NFR BLD AUTO: 24 %
MCH RBC QN AUTO: 27.2 PG (ref 26.6–33)
MCHC RBC AUTO-ENTMCNC: 33 G/DL (ref 31.5–35.7)
MCV RBC AUTO: 82 FL (ref 79–97)
MONOCYTES # BLD AUTO: 0.7 X10E3/UL (ref 0.1–0.9)
MONOCYTES NFR BLD AUTO: 8 %
NEUTROPHILS # BLD AUTO: 5.5 X10E3/UL (ref 1.4–7)
NEUTROPHILS NFR BLD AUTO: 65 %
PLATELET # BLD AUTO: 206 X10E3/UL (ref 150–450)
POTASSIUM SERPL-SCNC: 4.4 MMOL/L (ref 3.5–5.2)
PROT SERPL-MCNC: 7.3 G/DL (ref 6–8.5)
RBC # BLD AUTO: 4.15 X10E6/UL (ref 3.77–5.28)
SODIUM SERPL-SCNC: 142 MMOL/L (ref 134–144)
SPECIMEN STATUS REPORT: NORMAL
TRIGL SERPL-MCNC: 37 MG/DL (ref 0–149)
VLDLC SERPL CALC-MCNC: 9 MG/DL (ref 5–40)
WBC # BLD AUTO: 8.3 X10E3/UL (ref 3.4–10.8)

## 2024-09-17 ENCOUNTER — OFFICE VISIT (OUTPATIENT)
Dept: FAMILY MEDICINE | Facility: CLINIC | Age: 59
End: 2024-09-17
Payer: OTHER GOVERNMENT

## 2024-09-17 VITALS
BODY MASS INDEX: 27.46 KG/M2 | DIASTOLIC BLOOD PRESSURE: 70 MMHG | RESPIRATION RATE: 16 BRPM | OXYGEN SATURATION: 99 % | SYSTOLIC BLOOD PRESSURE: 119 MMHG | HEIGHT: 63 IN | HEART RATE: 69 BPM | WEIGHT: 155 LBS

## 2024-09-17 DIAGNOSIS — Z23 NEED FOR TETANUS, DIPHTHERIA, AND ACELLULAR PERTUSSIS (TDAP) VACCINE: ICD-10-CM

## 2024-09-17 DIAGNOSIS — E11.65 CONTROLLED TYPE 2 DIABETES MELLITUS WITH HYPERGLYCEMIA, WITHOUT LONG-TERM CURRENT USE OF INSULIN: ICD-10-CM

## 2024-09-17 DIAGNOSIS — E11.69 HYPERLIPIDEMIA ASSOCIATED WITH TYPE 2 DIABETES MELLITUS: ICD-10-CM

## 2024-09-17 DIAGNOSIS — E83.42 HYPOMAGNESEMIA: ICD-10-CM

## 2024-09-17 DIAGNOSIS — E78.5 HYPERLIPIDEMIA ASSOCIATED WITH TYPE 2 DIABETES MELLITUS: ICD-10-CM

## 2024-09-17 DIAGNOSIS — E11.59 HYPERTENSION ASSOCIATED WITH DIABETES: Primary | ICD-10-CM

## 2024-09-17 DIAGNOSIS — I15.2 HYPERTENSION ASSOCIATED WITH DIABETES: Primary | ICD-10-CM

## 2024-09-17 DIAGNOSIS — Z23 NEED FOR SHINGLES VACCINE: ICD-10-CM

## 2024-09-17 PROCEDURE — 99214 OFFICE O/P EST MOD 30 MIN: CPT | Mod: 25,,,

## 2024-09-17 PROCEDURE — 90750 HZV VACC RECOMBINANT IM: CPT | Mod: ,,,

## 2024-09-17 PROCEDURE — 90471 IMMUNIZATION ADMIN: CPT | Mod: ,,,

## 2024-09-17 PROCEDURE — 90715 TDAP VACCINE 7 YRS/> IM: CPT | Mod: ,,,

## 2024-09-17 PROCEDURE — 90472 IMMUNIZATION ADMIN EACH ADD: CPT | Mod: ,,,

## 2024-09-17 RX ORDER — INCONTINENCE PAD,LINER,DISP
EACH MISCELLANEOUS
COMMUNITY

## 2024-09-17 RX ORDER — BENAZEPRIL HYDROCHLORIDE AND HYDROCHLOROTHIAZIDE 20; 25 MG/1; MG/1
TABLET ORAL
COMMUNITY

## 2024-09-17 RX ORDER — SIMETHICONE 40MG/0.6ML
SUSPENSION, DROPS(FINAL DOSAGE FORM)(ML) ORAL
COMMUNITY

## 2024-09-17 RX ORDER — METFORMIN HYDROCHLORIDE 500 MG/1
1000 TABLET ORAL 2 TIMES DAILY WITH MEALS
COMMUNITY

## 2024-09-17 NOTE — PROGRESS NOTES
Patient ID: [id number]     Chief Complaint: HTN, HLD    HPI:     Cathie Santos is a 58 y.o. female here today for a follow up for HTN, HLD. BP is at goal with current rx. She denies medication side effects. She denies headaches, dizziness, syncope, CP, or SOB.    HLD is at goal with current rx. She denies medication side effects. She denies myalgia, denies abdominal pain.   Patient reports -120. HgA1c 7.7. She reports compliance with DM rx. She denies medication side effects. She follows up with [facility]. Patient states A1c will be checked at upcoming endocrinology appointment this month. DM foot exam is UTD. Patient is due for  DM eye exam. Last exam 2023.   Patient reports colonoscpy completed with Dr. Amaral in [place]. She request refax of MMG order to [facility].  Patient need shingles #2 and tdap vaccinations. Reviewed recent lab work with patient. She reports low magnesium level for which she was treated. She would like to have blood work to check her magnesium level.  The patient denies the need for medication refills today.    Past Medical History:   Diagnosis Date    Anemia, unspecified     HTN (hypertension)     Hypercholesterolemia     RUFINO (obstructive sleep apnea)     Type 2 diabetes mellitus without complications     Vitamin D deficiency         Past Surgical History:   Procedure Laterality Date    CHOLECYSTECTOMY          Social History     Tobacco Use    Smoking status: Never    Smokeless tobacco: Never   Substance and Sexual Activity    Alcohol use: Yes    Drug use: Never    Sexual activity: Yes        Current Outpatient Medications   Medication Instructions    aspirin (ECOTRIN) 81 mg, Oral, Daily    atorvastatin (LIPITOR) 80 mg, Oral, Daily    benazepril-hydrochlorthiazide (LOTENSIN HCT) 20-25 mg Tab 1 tablet Orally Once a day for 30 day(s)    biotin-keratin (BIOTIN PLUS KERATIN) 10,000-100 mcg-mg Tab as directed Orally    calcium-vitamin D3 (OS-AMADOR 500 + D3) 500  "mg-5 mcg (200 unit) per tablet 1 tablet, Oral, Daily    CONTOUR NEXT TEST STRIPS Strp 4 times daily    glipiZIDE (GLUCOTROL) 10 mg, Oral, 2 times daily    glucagon 3 mg, Nasal, Once as needed    metFORMIN (GLUCOPHAGE) 1,000 mg, Oral, 2 times daily with meals    MICROLET LANCET Misc USE 1 LANCET TO CHECK GLUCOSE 4 TIMES DAILY    multivit-min-iron-FA-vit K-lut (MULTIVITAMIN WOMEN 50 PLUS) 8 mg iron-400 mcg-50 mcg Tab as directed Orally    VITAMIN D2 50,000 Units, Oral, Every 7 days       Review of patient's allergies indicates:  No Known Allergies     Patient Care Team:  Stacey Hendrickson MD as PCP - General (Family Medicine)  Aislinn Williamson MD as Consulting Physician (Ophthalmology)     Subjective:     Review of Systems   Constitutional: Negative.    HENT:  Negative for congestion.    Eyes:  Negative for visual disturbance.   Respiratory:  Negative for cough, chest tightness, shortness of breath and wheezing.    Cardiovascular:  Negative for chest pain, palpitations and leg swelling.   Gastrointestinal:  Negative for abdominal pain, blood in stool, constipation, diarrhea, nausea and vomiting.   Genitourinary:  Negative for dysuria, frequency, hematuria and urgency.   Neurological:  Negative for dizziness, weakness, light-headedness and headaches.   Hematological: Negative.    Psychiatric/Behavioral:  Negative for hallucinations, sleep disturbance and suicidal ideas. The patient is not nervous/anxious and is not hyperactive.        12 point review of systems conducted, negative except as stated in the history of present illness. See HPI for details.    Objective:     Visit Vitals  /70 (BP Location: Left arm, Patient Position: Sitting, BP Method: Medium (Automatic))   Pulse 69   Resp 16   Ht 5' 3" (1.6 m)   Wt 70.3 kg (155 lb)   SpO2 99%   BMI 27.46 kg/m²       Physical Exam    General: Alert and oriented, No acute distress. Obese.   Head: Normocephalic, Atraumatic.  Eye: Pupils are equal, round and " reactive to light, Extraocular movements are intact, Sclera non-icteric.  Ears/Nose/Throat: Normal, Mucosa moist,Clear.  Neck/Thyroid: Supple, Non-tender, No carotid bruit, No palpable thyromegaly or thyroid nodule, No lymphadenopathy, No JVD, Full range of motion.  Respiratory: Clear to auscultation bilaterally; No wheezes, rales or rhonchi, Non-labored respirations, Symmetrical chest wall expansion.  Cardiovascular: Regular rate and rhythm, S1/S2 normal, No murmurs, rubs or gallops.  Gastrointestinal: Soft, Non-tender, Non-distended, Normal bowel sounds, No palpable organomegaly.  Musculoskeletal: Normal range of motion.  Integumentary: Warm, Dry, Intact, No suspicious lesions or rashes.  Extremities: No clubbing, cyanosis or edema  Neurologic: No focal deficits, Cranial Nerves II-XII are grossly intact, Motor strength normal upper and lower extremities, Sensory exam intact.  Psychiatric: Normal interaction, Coherent speech, Euthymic mood, Appropriate affect       Assessment:       ICD-10-CM ICD-9-CM   1. Hypertension associated with diabetes  E11.59 250.80    I15.2 401.9   2. Controlled type 2 diabetes mellitus with hyperglycemia, without long-term current use of insulin  E11.65 250.80     790.29   3. Hyperlipidemia associated with type 2 diabetes mellitus  E11.69 250.80    E78.5 272.4   4. Need for shingles vaccine  Z23 V04.89   5. Need for tetanus, diphtheria, and acellular pertussis (Tdap) vaccine  Z23 V06.1   6. Hypomagnesemia  E83.42 275.2        Plan:     1. Hypertension associated with diabetes  Assessment & Plan:  BP at goal with current rx. Continue lotensin as prescribed.  Stressed importance of following a low sodium diet (DASH Diet - Less than 2 grams of sodium per day).  Monitor blood pressure daily and log. Report consistent numbers greater than 130/80.  Smoking cessation encouraged to aid in BP reduction.  Maintain healthy weight with goal BMI <30. Exercise 30 minutes per day, 5 days per week.    Present to ED if BP >170/100 or <90/60, chest pain, palpitations, headache, SOB, temp greater than 100.4, or any acute illness.     2. Controlled type 2 diabetes mellitus with hyperglycemia, without long-term current use of insulin  Assessment & Plan:  HbA1C 7.7. Continue metformin and glipizide as prescribed.  Continue to monitor blood glucose, reporting readings >200 or <70.    On ACE and Statin according to guidelines.  Stressed importance of following ADA Diet. Avoid soda, simple sweets, and limit rice/pasta/breads/starches (no more than 45-50 grams per meal).  Maintain healthy weight with goal BMI <30.  Exercise 5 times per week for 30 minutes per day.  Stressed importance of daily foot exams.  Stressed importance of annual dilated eye exam. Patient agrees to schedule eye exam.    3. Hyperlipidemia associated with type 2 diabetes mellitus  Assessment & Plan:  Continue atorvastatin every evening.  Notify the provider if you experience abdominal pain, muscle aches or cramps.  Follow low cholesterol, low fat diet. Avoid fried foods and high saturated fats.  Increase fiber intake. Foods high in soluble fiber, such as oats, beans, lentils, fruits, and vegetables, can help lower LDL cholesterol levels.   Exercise/walk 5 times per week for 30 minutes a day. Regular physical activity can help raise HDL (high-density lipoprotein) cholesterol and lower LDL cholesterol.       4. Need for shingles vaccine  Assessment & Plan:  Administered in office at visit.  Orders:  -     varicella-zoster gE vac,2 of 2 SusR 0.5 mL    5. Need for tetanus, diphtheria, and acellular pertussis (Tdap) vaccine  Assessment & Plan:  Administered in office at visit.  Orders:  -     DIPH,PERTUSS(ACEL),TET VAC(PF)(ADULT)(ADACEL)(TDaP)    6. Hypomagnesemia  -     Magnesium; Future; Expected date: 09/17/2024       Follow up 6 months HTN, HLD. In addition to their scheduled follow up, the patient has also been instructed to follow up on as needed  basis.     Future Appointments   Date Time Provider Department Center   3/13/2025 10:30 AM Stacey Hendrickson MD [facility] [place]

## 2024-09-17 NOTE — ASSESSMENT & PLAN NOTE
Continue atorvastatin every evening.  Notify the provider if you experience abdominal pain, muscle aches or cramps.  Follow low cholesterol, low fat diet. Avoid fried foods and high saturated fats.  Increase fiber intake. Foods high in soluble fiber, such as oats, beans, lentils, fruits, and vegetables, can help lower LDL cholesterol levels.   Exercise/walk 5 times per week for 30 minutes a day. Regular physical activity can help raise HDL (high-density lipoprotein) cholesterol and lower LDL cholesterol.

## 2024-09-22 PROBLEM — Z23 NEED FOR SHINGLES VACCINE: Status: ACTIVE | Noted: 2024-09-22

## 2024-09-22 PROBLEM — Z23 NEED FOR TETANUS, DIPHTHERIA, AND ACELLULAR PERTUSSIS (TDAP) VACCINE: Status: ACTIVE | Noted: 2024-09-22

## 2024-09-22 PROBLEM — E83.42 HYPOMAGNESEMIA: Status: ACTIVE | Noted: 2024-09-22

## 2024-09-22 NOTE — ASSESSMENT & PLAN NOTE
HbA1C 7.7. Continue metformin and glipizide as prescribed.  Continue to monitor blood glucose, reporting readings >200 or <70.    On ACE and Statin according to guidelines.  Stressed importance of following ADA Diet. Avoid soda, simple sweets, and limit rice/pasta/breads/starches (no more than 45-50 grams per meal).  Maintain healthy weight with goal BMI <30.  Exercise 5 times per week for 30 minutes per day.  Stressed importance of daily foot exams.  Stressed importance of annual dilated eye exam. Patient agrees to schedule eye exam.

## 2024-09-30 ENCOUNTER — TELEPHONE (OUTPATIENT)
Dept: FAMILY MEDICINE | Facility: CLINIC | Age: 59
End: 2024-09-30
Payer: OTHER GOVERNMENT

## 2024-09-30 DIAGNOSIS — I15.2 HYPERTENSION ASSOCIATED WITH DIABETES: Primary | ICD-10-CM

## 2024-09-30 DIAGNOSIS — E11.59 HYPERTENSION ASSOCIATED WITH DIABETES: Primary | ICD-10-CM

## 2024-09-30 RX ORDER — BENAZEPRIL HYDROCHLORIDE AND HYDROCHLOROTHIAZIDE 20; 25 MG/1; MG/1
1 TABLET ORAL
Qty: 90 TABLET | Refills: 0 | Status: SHIPPED | OUTPATIENT
Start: 2024-09-30

## 2024-09-30 NOTE — TELEPHONE ENCOUNTER
----- Message from Gordo sent at 9/30/2024  1:44 PM CDT -----  .Type:  Needs Medical Advice    Who Called: Cathie  Symptoms (please be specific):    How long has patient had these symptoms:    Pharmacy name and phone #:    Would the patient rather a call back or a response via [facility]?   Best Call Back Number: [phone]  Additional Information: Patient requested a call back re: questions about her medication, please call her back when available. Thanks,

## 2024-09-30 NOTE — TELEPHONE ENCOUNTER
----- Message from ARAMIS Ward sent at 9/22/2024  6:21 PM CDT -----  Request colonoscopy from Dr Amaral in [place], fax [phone].    TY

## 2024-11-14 NOTE — ASSESSMENT & PLAN NOTE
BP at goal with current rx. Continue lotensin as prescribed.  Stressed importance of following a low sodium diet (DASH Diet - Less than 2 grams of sodium per day).  Monitor blood pressure daily and log. Report consistent numbers greater than 130/80.  Smoking cessation encouraged to aid in BP reduction.  Maintain healthy weight with goal BMI <30. Exercise 30 minutes per day, 5 days per week.   Present to ED if BP >170/100 or <90/60, chest pain, palpitations, headache, SOB, temp greater than 100.4, or any acute illness.    Render In Strict Bullet Format?: No Plan: Recommend Zeasorb AF powder Detail Level: Zone Plan: Educated patient on the importance of daily sunscreen use. Recommended EltaMD, Blue Lizard, Cerave, or any OTC equivalent.

## 2024-12-16 DIAGNOSIS — I15.2 HYPERTENSION ASSOCIATED WITH DIABETES: ICD-10-CM

## 2024-12-16 DIAGNOSIS — E11.59 HYPERTENSION ASSOCIATED WITH DIABETES: ICD-10-CM

## 2024-12-16 RX ORDER — BENAZEPRIL HYDROCHLORIDE AND HYDROCHLOROTHIAZIDE 20; 25 MG/1; MG/1
1 TABLET ORAL DAILY
Qty: 90 TABLET | Refills: 0 | Status: CANCELLED | OUTPATIENT
Start: 2024-12-16

## 2024-12-16 RX ORDER — BENAZEPRIL HYDROCHLORIDE AND HYDROCHLOROTHIAZIDE 20; 25 MG/1; MG/1
1 TABLET ORAL
Qty: 90 TABLET | Refills: 0 | Status: SHIPPED | OUTPATIENT
Start: 2024-12-16

## 2024-12-18 NOTE — PROGRESS NOTES
Patient ID: [id number]     Chief Complaint: Annual Exam (No new dx or sx)        HPI:     Cathie Santos is a 58 y.o. female here today for annual wellness exam.   Well Adult History   The patient presents for well adult exam. The patient's general health status is described as good. The patient's diet is described as balanced. Exercise: occasional. Associated symptoms consist of denies weight loss, denies weight gain, denies fatigue, denies headache, denies hearing loss and denies vision changes. Last menstrual period: Perimenopausal. Additional pertinent history: last dental exam: goes every 6 months, last eye exam: 2023 (doesn't wear corrective lens, with Dr. Guillory), last pap smear:  (WNL with GYN at [facility], Dr. Jannie Vail), last MM2023 at [facility], she needs ordered, last Cologuard: 2021, negative, she would like screening Colonoscopy scheduled with Dr. Amaral, seat belt use, daily caffeine use (soft drinks), tobacco use none, social alcohol use and She had labs done on 2024, here to discuss the results today, she does HgA1c with her Endocrinologist (Dr. Mitchell). She would not like STD screening. She would like Prevnar 20 and Shingrix today, she will postpone Tdap until when she gets her 2nd Shingrix, but she is UTD on vaccines. DM II is controlled with Rx, no side effects, asymptomatic, she does followup with Endocrinology (Dr. Mitchell). HTN is controlled with Rx, asymptomatic, but she feels like the feels like the Benazepril is causing her eyelids to swell, would like to try a different Rx, she denies any other side effects. Hypercholesterolemia is controlled with Rx, no side effects, asymptomatic, she does followup with Cardiology (Dr. Robbins) as scheduled. She has positive SSB, CCP, and RF with polyarthritis, she was diagnosed with RA and mixed connective tissue disorder, was seeing Rheumatology (Dr. Jade), but he moved and she needs a new referral,  asymptomatic  -Patient is without any other complaints today.     Advance Care Planning     Date: 03/11/2024    Power of   I initiated the process of voluntary advance care planning today and explained the importance of this process to the patient.  I introduced the concept of advance directives to the patient, as well. Then the patient received detailed information about the importance of designating a Health Care Power of  (HCPOA). She was also instructed to communicate with this person about their wishes for future healthcare, should she become sick and lose decision-making capacity. The patient has previously appointed a HCPOA. After our discussion, the patient has decided to complete a HCPOA and has appointed her significant other, health care agent:  Gilbert Washington  & health care agent number:  [phone]  I spent a total time of 5 minutes discussing this issue with the patient.                  ----------------------------  Anemia, unspecified  HTN (hypertension)  Hypercholesterolemia  RUFINO (obstructive sleep apnea)  Type 2 diabetes mellitus without complications  Vitamin D deficiency     Past Surgical History:   Procedure Laterality Date    CHOLECYSTECTOMY         Review of patient's allergies indicates:  No Known Allergies    Outpatient Medications Marked as Taking for the 3/11/24 encounter (Office Visit) with Stacey Hendrickson MD   Medication Sig Dispense Refill    aspirin (ECOTRIN) 81 MG EC tablet Take 81 mg by mouth once daily.      atorvastatin (LIPITOR) 80 MG tablet Take 1 tablet by mouth once daily.      CONTOUR NEXT TEST STRIPS Strp 4 (four) times daily.      glipiZIDE (GLUCOTROL) 10 MG TR24 Take 10 mg by mouth 2 (two) times daily.      VITAMIN D2 1,250 mcg (50,000 unit) capsule TAKE ONE CAPSULE BY MOUTH ONCE WEEKLY 12 capsule 0    [DISCONTINUED] benazepril-hydrochlorthiazide (LOTENSIN HCT) 20-25 mg Tab Take 1 tablet by mouth once daily. 90 tablet 3    [DISCONTINUED]  metFORMIN (GLUCOPHAGE-XR) 750 MG ER 24hr tablet Take 750 mg by mouth daily with breakfast.       Current Facility-Administered Medications for the 3/11/24 encounter (Office Visit) with Stacey Hendrickson MD   Medication Dose Route Frequency Provider Last Rate Last Admin    pneumoc 20-agatha conj-dip cr(PF) (PREVNAR-20 (PF)) injection Syrg 0.5 mL  0.5 mL Intramuscular vaccine x 1 dose Stacey Hendrickson MD        varicella-zoster gE-AS01B (PF)(SHINGRIX) 50 mcg/0.5 mL injection  0.5 mL Intramuscular 1 time in Clinic/HOD Stacey Hendrickson MD           Social History     Socioeconomic History    Marital status: Unknown   Tobacco Use    Smoking status: Never    Smokeless tobacco: Never   Substance and Sexual Activity    Alcohol use: Yes    Drug use: Never    Sexual activity: Yes     Social Determinants of Health     Financial Resource Strain: Low Risk  (3/11/2024)    Overall Financial Resource Strain (CARDIA)     Difficulty of Paying Living Expenses: Not hard at all   Food Insecurity: No Food Insecurity (3/11/2024)    Hunger Vital Sign     Worried About Running Out of Food in the Last Year: Never true     Ran Out of Food in the Last Year: Never true   Transportation Needs: No Transportation Needs (3/11/2024)    PRAPARE - Transportation     Lack of Transportation (Medical): No     Lack of Transportation (Non-Medical): No   Physical Activity: Patient Declined (3/11/2024)    Exercise Vital Sign     Days of Exercise per Week: Patient declined     Minutes of Exercise per Session: Patient declined   Stress: No Stress Concern Present (3/11/2024)    [employer] - Occupational Stress Questionnaire     Feeling of Stress : Not at all   Social Connections: Patient Declined (3/11/2024)    Social Connection and Isolation Panel [NHANES]     Frequency of Communication with Friends and Family: Patient declined     Frequency of Social Gatherings with Friends and Family: Patient declined     Attends  "Buddhist Services: Patient declined     Active Member of Clubs or Organizations: Patient declined     Attends Club or Organization Meetings: Patient declined     Marital Status: Patient declined   Housing Stability: Low Risk  (3/11/2024)    Housing Stability Vital Sign     Unable to Pay for Housing in the Last Year: No     Number of Places Lived in the Last Year: 1     Unstable Housing in the Last Year: No        Family History   Problem Relation Age of Onset    Diabetes Mother     Arthritis Mother     Diabetes Sister     Diabetes Brother         Subjective:       Review of Systems:    See HPI for details    Constitutional: Denies Change in appetite. Denies Chills. Denies Fever. Denies Night sweats.  Eye: Denies Blurred vision. Denies Discharge. Denies Eye pain.  ENT: Denies Decreased hearing. Denies Sore throat. Denies Swollen glands.  Respiratory: Denies Cough. Denies Shortness of breath. Denies Shortness of breath with exertion. Denies Wheezing.  Cardiovascular: Denies Chest pain at rest. Denies Chest pain with exertion. Denies Irregular heartbeat. Denies Palpitations.  Gastrointestinal: Denies Abdominal pain. Denies Diarrhea. Denies Nausea. Denies Vomiting. Denies Hematemesis or Hematochezia.  Genitourinary: Denies Dysuria. Denies Urinary frequency. Denies Urinary urgency. Denies Blood in urine.  Endocrine: Denies Cold intolerance. Denies Excessive thirst. Denies Heat intolerance. Denies Weight loss. Denies Weight gain.  Musculoskeletal: Denies Painful joints. Denies Weakness.  Integumentary: Denies Rash. Denies Itching. Denies Dry skin.  Neurologic: Denies Dizziness. Denies Fainting. Denies Headache.  Psychiatric: Denies Depression. Denies Anxiety. Denies Suicidal/Homicidal ideations.    All Other ROS: Negative except as stated in HPI.       Objective:     /66 (BP Location: Right arm, Patient Position: Sitting, BP Method: Medium (Automatic))   Pulse 80   Ht 5' 3" (1.6 m)   Wt 71.2 kg (156 lb 14.4 oz)  "  SpO2 98%   BMI 27.79 kg/m²     Physical Exam    General: Alert and oriented, No acute distress. Overweight.   Head: Normocephalic, Atraumatic.  Eye: Pupils are equal, round and reactive to light, Extraocular movements are intact, Sclera non-icteric.  Ears/Nose/Throat: Normal, Mucosa moist,Clear.  Neck/Thyroid: Supple, Non-tender, No carotid bruit, No palpable thyromegaly or thyroid nodule, No lymphadenopathy, No JVD, Full range of motion.  Respiratory: Clear to auscultation bilaterally; No wheezes, rales or rhonchi,Non-labored respirations, Symmetrical chest wall expansion.  Cardiovascular: Regular rate and rhythm, S1/S2 normal, No murmurs, rubs or gallops.  Gastrointestinal: Soft, Non-tender, Non-distended, Normal bowel sounds, No palpable organomegaly.  Musculoskeletal: Normal range of motion.  Integumentary: Warm, Dry, Intact, No suspicious lesions or rashes.  Protective Sensation (w/ 10 gram monofilament):  Right: Intact  Left: Intact    Visual Inspection:  Normal -  Bilateral    Pedal Pulses:   Right: Present  Left: Present    Posterior Tibialis Pulses:   Right:Present  Left: Present   Extremities: No clubbing, cyanosis or edema  Neurologic: No focal deficits, Cranial Nerves II-XII are grossly intact, Motor strength normal upper and lower extremities, Sensory exam intact.  Psychiatric: Normal interaction, Coherent speech, Euthymic mood, Appropriate affect.        Assessment:       ICD-10-CM ICD-9-CM   1. Wellness examination  Z00.00 V70.0   2. Visit for screening mammogram  Z12.31 V76.12   3. Screening for colon cancer  Z12.11 V76.51   4. Need for pneumococcal 20-valent conjugate vaccination  Z23 V03.82   5. Need for shingles vaccine  Z23 V04.89   6. Controlled type 2 diabetes mellitus with hyperglycemia, without long-term current use of insulin  E11.65 250.80     790.29   7. Hyperlipidemia associated with type 2 diabetes mellitus  E11.69 250.80    E78.5 272.4   8. Hypertension associated with diabetes   E11.59 250.80    I15.2 401.9   9. Polyarthritis with positive rheumatoid factor  M05.80 714.0        Plan:     Problem List Items Addressed This Visit          Cardiac/Vascular    Hypertension associated with diabetes    Relevant Medications    glipiZIDE (GLUCOTROL) 10 MG TR24    metFORMIN (GLUCOPHAGE-XR) 750 MG ER 24hr tablet    olmesartan-hydrochlorothiazide (BENICAR HCT) 20-12.5 mg per tablet    Other Relevant Orders    CBC Auto Differential    Comprehensive Metabolic Panel    Hyperlipidemia associated with type 2 diabetes mellitus    Relevant Medications    glipiZIDE (GLUCOTROL) 10 MG TR24    metFORMIN (GLUCOPHAGE-XR) 750 MG ER 24hr tablet    Other Relevant Orders    Comprehensive Metabolic Panel    Lipid Panel    CK       Immunology/Multi System    Polyarthritis with positive rheumatoid factor    Relevant Orders    Ambulatory referral/consult to Rheumatology       Endocrine    Controlled type 2 diabetes mellitus with hyperglycemia, without long-term current use of insulin    Relevant Medications    glipiZIDE (GLUCOTROL) 10 MG TR24    metFORMIN (GLUCOPHAGE-XR) 750 MG ER 24hr tablet    Other Relevant Orders    Ambulatory referral/consult to Ophthalmology     Other Visit Diagnoses       Wellness examination    -  Primary    Visit for screening mammogram        Relevant Orders    Mammo Digital Screening Bilat w/ Gibson    Screening for colon cancer        Relevant Orders    Ambulatory referral/consult to Gastroenterology    Need for pneumococcal 20-valent conjugate vaccination        Relevant Medications    pneumoc 20-agatha conj-dip cr(PF) (PREVNAR-20 (PF)) injection Syrg 0.5 mL (Start on 3/11/2024 12:46 PM)    Need for shingles vaccine        Relevant Medications    varicella-zoster gE-AS01B (PF)(SHINGRIX) 50 mcg/0.5 mL injection (Start on 3/11/2024 12:00 PM)         1. Wellness examination  - Monthly breast self exam encouraged. Diet, exercise, and 10% weight loss encouraged. Keep appointment for dental exams x q6  months as scheduled. Keep appointment for annual eye exam as scheduled. Keep appointment with GYN for annual pap smear as scheduled. Keep appointment with specialists, Notify M.D. or ER if temp greater than 100.4, or any acute illness.      2. Visit for screening mammogram  - Mammo Digital Screening Bilat w/ Gibson; Future  - Mammo Digital Screening Bilat w/ Gibson    3. Screening for colon cancer  - Ambulatory referral/consult to Gastroenterology; Future for screening Colonoscopy.    4. Need for pneumococcal 20-valent conjugate vaccination  - pneumoc 20-agatha conj-dip cr(PF) (PREVNAR-20 (PF)) injection Syrg 0.5 mL IM x 1 today    5. Need for shingles vaccine  - varicella-zoster gE-AS01B (PF)(SHINGRIX) 50 mcg/0.5 mL injection IM x 1 today; 2nd Shingrix in 2-6 months.    6. Controlled type 2 diabetes mellitus with hyperglycemia, without long-term current use of insulin  - Ambulatory referral/consult to Ophthalmology; Future for eye exam  - Stable, continue followup with Endocrinology as scheduled.  Lab Results   Component Value Date    HGBA1C 12.2 (H) 06/09/2023      Continue   On ARB and Statin according to guidelines.  Follow ADA Diet. Avoid soda, simple sweets, and limit rice/pasta/breads/starches.  Maintain healthy weight with goal BMI <30.  Exercise 5 times per week for 30 minutes per day.  Stressed importance of daily foot exams.  Stressed importance of annual dilated eye exam.     7. Hyperlipidemia associated with type 2 diabetes mellitus  - Controlled, continue Lipitor as prescribed, recheck CMP, FLP, CPK in 09/2024.   Continue  Stressed importance of dietary modifications. Follow a low cholesterol, low saturated fat diet with less that 200mg of cholesterol a day.  Avoid fried foods and high saturated fats (high saturated fats less than 7% of calories).  Add Flax Seed/Fish Oil supplements to diet. Increase dietary fiber.  Regular exercise can reduce LDL and raise HDL. Stressed importance of physical activity 5  times per week for 30 minutes per day.      8. Hypertension associated with diabetes  - Discontinue Benazepril/HCT due to side effects; Rx trial olmesartan-hydrochlorothiazide (BENICAR HCT) 20-12.5 mg per tablet; Take 1 tablet by mouth once daily.  Dispense: 30 tablet; Refill: 2.  - BP is well controlled. Keep daily BP log. Nurse visit for BP check 1-2 weeks due to BP Rx change. Notify M.D. or ER if BP >170/100 or <90/60, chest pain, palpitations, headache, SOB, temp greater than 100.4, or any acute illness.   Continue  Low Sodium Diet (DASH Diet - Less than 2 grams of sodium per day).  Monitor blood pressure daily and log. Report consistent numbers greater than 140/90.  Smoking cessation encouraged to aid in BP reduction.  Maintain healthy weight with goal BMI <30. Exercise 30 minutes per day, 5 days per week.      9. Polyarthritis with positive rheumatoid factor  - Ambulatory referral/consult to Rheumatology; Future for evaluation and treatment.        Cathie was seen today for annual exam.    Diagnoses and all orders for this visit:    Wellness examination    Visit for screening mammogram  -     Mammo Digital Screening Bilat w/ Gibson; Future  -     Mammo Digital Screening Bilat w/ [facility]    Screening for colon cancer  -     Ambulatory referral/consult to Gastroenterology; Future    Need for pneumococcal 20-valent conjugate vaccination  -     pneumoc 20-agatha conj-dip cr(PF) (PREVNAR-20 (PF)) injection Syrg 0.5 mL    Need for shingles vaccine  -     varicella-zoster gE-AS01B (PF)(SHINGRIX) 50 mcg/0.5 mL injection    Controlled type 2 diabetes mellitus with hyperglycemia, without long-term current use of insulin  -     Ambulatory referral/consult to Ophthalmology; Future    Hyperlipidemia associated with type 2 diabetes mellitus  -     Comprehensive Metabolic Panel; Future  -     Lipid Panel; Future  -     CK; Future    Hypertension associated with diabetes  -     olmesartan-hydrochlorothiazide (BENICAR HCT) 20-12.5 mg  per tablet; Take 1 tablet by mouth once daily.  -     CBC Auto Differential; Future  -     Comprehensive Metabolic Panel; Future    Polyarthritis with positive rheumatoid factor  -     Ambulatory referral/consult to Rheumatology; Future          Medication List with Changes/Refills   New Medications    OLMESARTAN-HYDROCHLOROTHIAZIDE (BENICAR HCT) 20-12.5 MG PER TABLET    Take 1 tablet by mouth once daily.       Start Date: 3/11/2024 End Date: 6/9/2024   Current Medications    ASPIRIN (ECOTRIN) 81 MG EC TABLET    Take 81 mg by mouth once daily.       Start Date: --        End Date: --    ATORVASTATIN (LIPITOR) 80 MG TABLET    Take 1 tablet by mouth once daily.       Start Date: 3/13/2023 End Date: --    CALCIUM-VITAMIN D3 (OS-AMADOR 500 + D3) 500 MG-5 MCG (200 UNIT) PER TABLET    Take 1 tablet by mouth once daily.       Start Date: --        End Date: --    CONTOUR NEXT TEST STRIPS STRP    4 (four) times daily.       Start Date: 6/13/2023 End Date: --    GLIPIZIDE (GLUCOTROL) 10 MG TR24    Take 10 mg by mouth 2 (two) times daily.       Start Date: 1/31/2024 End Date: --    GLUCAGON 3 MG/ACTUATION SPRY    3 mg by Nasal route once as needed.       Start Date: --        End Date: --    METFORMIN (GLUCOPHAGE-XR) 750 MG ER 24HR TABLET    Take 750 mg by mouth 2 (two) times daily with meals.       Start Date: --        End Date: --    MICROLET LANCET [facility]    USE 1 LANCET TO CHECK GLUCOSE 4 TIMES DAILY       Start Date: 6/15/2023 End Date: --    VITAMIN D2 1,250 MCG (50,000 UNIT) CAPSULE    TAKE ONE CAPSULE BY MOUTH ONCE WEEKLY       Start Date: 8/21/2023 End Date: --   Discontinued Medications    BENAZEPRIL-HYDROCHLORTHIAZIDE (LOTENSIN HCT) 20-25 MG TAB    Take 1 tablet by mouth once daily.       Start Date: 6/29/2023 End Date: 3/11/2024    GLIPIZIDE (GLUCOTROL) 5 MG TABLET    Take 1 tablet by mouth once daily.       Start Date: 2/13/2023 End Date: 3/11/2024    HYDROXYCHLOROQUINE (PLAQUENIL) 200 MG TABLET    Take 1 tablet (200  Include Location In Plan?: No mg total) by mouth 2 (two) times daily. After food       Start Date: 8/15/2023 End Date: 3/11/2024    INSULIN GLARGINE 100 UNITS/ML SUBQ PEN    Inject 10 Units into the skin once daily.       Start Date: --        End Date: 3/11/2024    METFORMIN (GLUCOPHAGE-XR) 750 MG ER 24HR TABLET    Take 750 mg by mouth daily with breakfast.       Start Date: --        End Date: 3/11/2024    SEMAGLUTIDE (OZEMPIC) 0.25 MG OR 0.5 MG (2 MG/3 ML) PEN INJECTOR    Inject 0.25 mg into the skin every 7 days.       Start Date: --        End Date: 3/11/2024    SIMVASTATIN (ZOCOR) 80 MG TABLET    Take 1 tablet by mouth every evening.       Start Date: --        End Date: 3/11/2024          Follow up in about 1 week (around 3/18/2024) for Blood Pressure Check- Nurse; 6 month followup HTN, HLD, 2nd Shingrix, Tdap- 30 minute appointment.    Detail Level: Zone

## 2024-12-19 LAB — BCS RECOMMENDATION EXT: NORMAL

## 2024-12-30 ENCOUNTER — DOCUMENTATION ONLY (OUTPATIENT)
Dept: FAMILY MEDICINE | Facility: CLINIC | Age: 59
End: 2024-12-30
Payer: OTHER GOVERNMENT

## 2025-01-09 ENCOUNTER — TELEPHONE (OUTPATIENT)
Dept: FAMILY MEDICINE | Facility: CLINIC | Age: 60
End: 2025-01-09
Payer: OTHER GOVERNMENT

## 2025-01-09 NOTE — TELEPHONE ENCOUNTER
----- Message from Vitor sent at 1/9/2025  9:54 AM CST -----  .Who Called: Cathie Washington    Caller is requesting a same day appointment. Caller declined first available appointment listed below.      When is the first available appointment? 03/19/25  Options offered (Virtual Visit, Urgent Care): Urgent care  Symptoms or reason for appointment: toe nail, diabetic      Preferred Method of Contact: Phone Call  Patient's Preferred Phone Number on File: [phone]   Best Call Back Number, if different:  Additional Information:

## 2025-01-21 NOTE — TELEPHONE ENCOUNTER
----- Message from Stacey Hendrickson MD sent at 11/9/2022  4:43 PM CST -----  Which arm? Please advise. Thanks.   ----- Message -----  From: Chris Moreira MA  Sent: 11/9/2022   4:42 PM CST  To: Stacey Hendrickson MD    Pt needs order for arm x-ray, [facility] says there is only an order for the neck      ----- Message -----  From: Vitor Shanks  Sent: 11/9/2022   4:27 PM CST  To: Madhavi FIORE Staff    .Type:  Needs Medical Advice    Who Called: pt  Would the patient rather a call back or a response via [facility]? Call back  Best Call Back Number: [phone]  Additional Information: pt is requesting order clarification on an Xray order            [Anticipatory Guidance Given] : Anticipatory guidance addressed as per the history of present illness section [Parental Well-Being] : parental well-being [Family Adjustment] : family adjustment [Feeding Routines] : feeding routines [Infant Adjustment] : infant adjustment [Safety] : safety [Hepatitis B] : hepatitis B [No Medications] : ~He/She~ is not on any medications [Mother] : mother [Parental Concerns Addressed] : Parental concerns addressed [] : The components of the vaccine(s) to be administered today are listed in the plan of care. The disease(s) for which the vaccine(s) are intended to prevent and the risks have been discussed with the caretaker.  The risks are also included in the appropriate vaccination information statements which have been provided to the patient's caregiver.  The caregiver has given consent to vaccinate. [FreeTextEntry1] : Assessment and Plan:   - **Infantile Gassiness:** The baby is experiencing excessive gassiness, likely related to normal infant digestive development and potentially influenced by the mother's diet. The stools are yellow without mucus, indicating no signs of milk allergy or significant digestive issues.     - Reassurance: Explained that some babies are gassier than others and that it can be influenced by diet      - Dietary advice: Suggested monitoring dairy intake and its potential impact on the baby's gassiness      - Monitoring: Continue to observe stool consistency and color      - Follow-up: Routine follow-up as scheduled    - **Positional Head Preference:** The baby shows a preference for turning her head to the right side, but demonstrates ability to turn to both sides when stimulated. No signs of torticollis or plagiocephaly are present.     - Reassurance: Explained that the head preference is not causing any flattening or developmental issues at this time      - Education: Instructed parents on techniques to encourage head turning to the less-preferred side      - Monitoring: Continue to observe for any changes in head shape or neck mobility      - Follow-up: Reassess at next well-child visit    - **Normal Growth and Development:** The baby appears to be growing appropriately with no significant concerns noted.     - Continuation of routine well-      - Encouragement of age-appropriate developmental activities      - Follow-up: Next scheduled well-child visit in a couple of weeks      - Recommend exclusive breastfeeding, 8-12 feedings per day. Mother should continue prenatal vitamins and avoid alcohol. If formula is needed, recommend iron-fortified formulations, 2-4 oz every 2-3 hrs. When in car, patient should be in rear-facing car seat in back seat. Put baby to sleep on back, in own crib with no loose or soft bedding. Help baby to develop sleep and feeding routines. May offer pacifier if needed. Start tummy time when awake. Limit baby's exposure to others, especially those with fever or unknown vaccine status. Parents counseled to call if rectal temperature >100.4 degrees F.

## 2025-03-07 DIAGNOSIS — Z00.00 WELLNESS EXAMINATION: Primary | ICD-10-CM

## 2025-03-10 LAB
CHOLEST SERPL-MCNC: 43 MG/DL
CHOLEST SERPL-MSCNC: 155 MG/DL (ref 0–200)
HDLC SERPL-MCNC: 65 MG/DL
LDLC SERPL CALC-MCNC: 80 MG/DL (ref 0–160)
VLDLC SERPL-MCNC: 10 MG/DL

## 2025-03-13 ENCOUNTER — RESULTS FOLLOW-UP (OUTPATIENT)
Dept: FAMILY MEDICINE | Facility: CLINIC | Age: 60
End: 2025-03-13

## 2025-03-13 ENCOUNTER — OFFICE VISIT (OUTPATIENT)
Dept: FAMILY MEDICINE | Facility: CLINIC | Age: 60
End: 2025-03-13
Payer: OTHER GOVERNMENT

## 2025-03-13 ENCOUNTER — CLINICAL SUPPORT (OUTPATIENT)
Dept: FAMILY MEDICINE | Facility: CLINIC | Age: 60
End: 2025-03-13
Attending: FAMILY MEDICINE
Payer: OTHER GOVERNMENT

## 2025-03-13 VITALS
TEMPERATURE: 98 F | DIASTOLIC BLOOD PRESSURE: 73 MMHG | HEART RATE: 73 BPM | HEIGHT: 63 IN | OXYGEN SATURATION: 98 % | SYSTOLIC BLOOD PRESSURE: 123 MMHG | WEIGHT: 148.5 LBS | BODY MASS INDEX: 26.31 KG/M2 | RESPIRATION RATE: 18 BRPM

## 2025-03-13 DIAGNOSIS — M05.80 POLYARTHRITIS WITH POSITIVE RHEUMATOID FACTOR: ICD-10-CM

## 2025-03-13 DIAGNOSIS — E78.5 HYPERLIPIDEMIA ASSOCIATED WITH TYPE 2 DIABETES MELLITUS: ICD-10-CM

## 2025-03-13 DIAGNOSIS — H35.363 DRUSEN OF MACULA OF BOTH EYES: Primary | ICD-10-CM

## 2025-03-13 DIAGNOSIS — L84 PRE-ULCERATIVE CALLUSES: ICD-10-CM

## 2025-03-13 DIAGNOSIS — E11.69 HYPERLIPIDEMIA ASSOCIATED WITH TYPE 2 DIABETES MELLITUS: ICD-10-CM

## 2025-03-13 DIAGNOSIS — E11.65 CONTROLLED TYPE 2 DIABETES MELLITUS WITH HYPERGLYCEMIA, WITHOUT LONG-TERM CURRENT USE OF INSULIN: ICD-10-CM

## 2025-03-13 DIAGNOSIS — B35.1 ONYCHOMYCOSIS OF GREAT TOE: ICD-10-CM

## 2025-03-13 DIAGNOSIS — I15.2 HYPERTENSION ASSOCIATED WITH DIABETES: ICD-10-CM

## 2025-03-13 DIAGNOSIS — E11.59 HYPERTENSION ASSOCIATED WITH DIABETES: ICD-10-CM

## 2025-03-13 DIAGNOSIS — Z00.00 WELLNESS EXAMINATION: Primary | ICD-10-CM

## 2025-03-13 DIAGNOSIS — Z12.11 SCREENING FOR COLON CANCER: ICD-10-CM

## 2025-03-13 PROCEDURE — 92228 IMG RTA DETC/MNTR DS PHY/QHP: CPT | Mod: TC,,, | Performed by: FAMILY MEDICINE

## 2025-03-13 PROCEDURE — 99396 PREV VISIT EST AGE 40-64: CPT | Mod: ,,, | Performed by: FAMILY MEDICINE

## 2025-03-13 RX ORDER — BENAZEPRIL HYDROCHLORIDE AND HYDROCHLOROTHIAZIDE 20; 25 MG/1; MG/1
1 TABLET ORAL DAILY
Qty: 90 TABLET | Refills: 3 | Status: SHIPPED | OUTPATIENT
Start: 2025-03-13 | End: 2026-03-13

## 2025-03-13 RX ORDER — SIMETHICONE 40MG/0.6ML
1 SUSPENSION, DROPS(FINAL DOSAGE FORM)(ML) ORAL DAILY
Qty: 90 TABLET | Refills: 3 | Status: SHIPPED | OUTPATIENT
Start: 2025-03-13 | End: 2026-03-13

## 2025-03-13 RX ORDER — ATORVASTATIN CALCIUM 80 MG/1
80 TABLET, FILM COATED ORAL NIGHTLY
Qty: 90 TABLET | Refills: 3 | Status: SHIPPED | OUTPATIENT
Start: 2025-03-13

## 2025-03-13 RX ORDER — INCONTINENCE PAD,LINER,DISP
1 EACH MISCELLANEOUS DAILY
Qty: 90 TABLET | Refills: 3 | Status: SHIPPED | OUTPATIENT
Start: 2025-03-13 | End: 2026-03-13

## 2025-03-13 RX ORDER — CICLOPIROX 80 MG/ML
SOLUTION TOPICAL NIGHTLY
Qty: 6.6 ML | Refills: 0 | Status: SHIPPED | OUTPATIENT
Start: 2025-03-13

## 2025-03-13 NOTE — PROGRESS NOTES
Cathie Santos is a 59 y.o. female here for a diabetic eye screening with non-dilated fundus photos per Dr. Hendrickson.    Patient cooperative?: Yes  Small pupils?: No  Last eye exam: n/a    For exam results, see Encounter Report.

## 2025-03-13 NOTE — PROGRESS NOTES
Patient ID: [id number]     Chief Complaint: Annual Exam        HPI:     Cathie Santos is a 59 y.o. female here today for annual wellness exam.  Well Adult History   The patient presents for well adult exam. The patient's general health status is described as good. The patient's diet is described as balanced. Exercise: occasional. Associated symptoms consist of denies weight loss, denies weight gain, denies fatigue, denies headache, denies hearing loss and denies vision changes. Last menstrual period: Perimenopausal. Additional pertinent history: last dental exam: goes every 6 months, last eye exam: 2023 (doesn't wear corrective lens, with Dr. Guillory, would like DM II eye exam today), last pap smear:  (WNL with GYN at [facility], Dr. Jannie Vail), last MM2024 at [facility], last Cologuard: 2021, negative, she would like screening Colonoscopy scheduled with Dr. Amaral, seat belt use, daily caffeine use (soft drinks), tobacco use none, social alcohol use and She had labs done on 2025, results are still pending, she does HgA1c with her Endocrinologist (Dr. Mitchell). She would not like STD screening. She will consider RSV vaccine, but she is UTD on vaccines. DM II is controlled with Rx, no side effects, asymptomatic, she does followup with Endocrinology (Dr. Mitchell), would like referral to Podiatry for DM II foot care. HTN is controlled with Rx, asymptomatic, she denies any Rx side effects. Hypercholesterolemia is controlled with Rx, no side effects, asymptomatic, she does followup with Cardiology (Dr. Robbins) as scheduled. She has positive SSB, CCP, and RF with polyarthritis, she was diagnosed with RA and mixed connective tissue disorder, seeing Rheumatology (Dr. Jade), asymptomatic  -Patient is without any other complaints today.     Advance Care Planning     Date: 2025  Patient did not wish or was not able to name a surrogate decision maker or provide an Advance  Care Plan.             -------------------------------------    Anemia, unspecified    HTN (hypertension)    Hypercholesterolemia    RUFINO (obstructive sleep apnea)    Type 2 diabetes mellitus without complications    Vitamin D deficiency        Past Surgical History:   Procedure Laterality Date    CHOLECYSTECTOMY         Review of patient's allergies indicates:  No Known Allergies    Outpatient Medications Marked as Taking for the 3/13/25 encounter (Office Visit) with Stacey Hendrickson MD   Medication Sig Dispense Refill    aspirin (ECOTRIN) 81 MG EC tablet Take 81 mg by mouth once daily.      CONTOUR NEXT TEST STRIPS Strp 4 (four) times daily.      glipiZIDE (GLUCOTROL) 10 MG TR24 Take 10 mg by mouth 2 (two) times daily.      metFORMIN (GLUCOPHAGE) 500 MG tablet Take 1,000 mg by mouth 2 (two) times daily with meals.      MICROLET LANCET Misc USE 1 LANCET TO CHECK GLUCOSE 4 TIMES DAILY      [DISCONTINUED] atorvastatin (LIPITOR) 80 MG tablet Take 1 tablet (80 mg total) by mouth once daily. 90 tablet 3    [DISCONTINUED] benazepril-hydrochlorthiazide (LOTENSIN HCT) 20-25 mg Tab Take 1 tablet by mouth once daily 90 tablet 0       Social History[1]     Family History   Problem Relation Name Age of Onset    Diabetes Mother NA     Arthritis Mother NA     Diabetes Sister NA     Diabetes Brother NA         Subjective:       Review of Systems:    See HPI for details    Constitutional: No fever, No chills, No fatigue.   Eye: No blurring, No visual disturbances.   Ear/Nose/Mouth/Throat: No decreased hearing, No ear pain, No nasal congestion, No sore throat.   Respiratory: No shortness of breath, No cough, No wheezing.   Cardiovascular: No chest pain, No palpitations, No peripheral edema.   Breast: Both breasts, No lump/ mass, No pain.   Nipple discharge: None.   Gastrointestinal: No nausea, No vomiting, No diarrhea, No constipation, No abdominal pain.   Genitourinary: No dysuria, No hematuria.   Gynecologic: Negative except  "as documented in history of present illness.   Hematology/Lymphatics: No bruising tendency, No bleeding tendency, No swollen lymph glands.   Endocrine: No excessive thirst, No polyuria, No excessive hunger.   Musculoskeletal: No joint pain, No muscle pain, No decreased range of motion.   Integumentary: No rash, No pruritus.   Neurologic: No abnormal balance, No confusion, No headache.   Psychiatric: No sleeping problems, No anxiety, No depression, Not suicidal, No hallucinations      All Other ROS: Negative except as stated in HPI.       Objective:     /73 (BP Location: Left arm, Patient Position: Sitting)   Pulse 73   Temp 98 °F (36.7 °C) (Oral)   Resp 18   Ht 5' 3" (1.6 m)   Wt 67.4 kg (148 lb 8 oz)   SpO2 98%   BMI 26.31 kg/m²     Physical Exam    General: Alert and oriented, No acute distress.   Appearance: Obese.   Eye: Pupils are equal, round and reactive to light, Normal conjunctiva.   HENT: Normocephalic, Tympanic membranes are clear, Normal hearing, Oral mucosa is moist, No pharyngeal erythema.   Throat: Pharynx ( Not edematous, No exudate ).   Neck: Supple, Non-tender, No carotid bruit, No lymphadenopathy, No thyromegaly.   Respiratory: Lungs are clear to auscultation, Respirations are non-labored, Breath sounds are equal, Symmetrical chest wall expansion, No chest wall tenderness.   Cardiovascular: Normal rate, Regular rhythm, No murmur, Good pulses equal in all extremities, No edema.   Gastrointestinal: Soft, Non-tender, Non-distended, Normal bowel sounds, No organomegaly, Abdomen.   Genitourinary: No costovertebral angle tenderness.   Musculoskeletal: Normal range of motion, No tenderness, Normal gait.  Protective Sensation (w/ 10 gram monofilament):  Right: Intact  Left: Intact    Visual Inspection:  Callus -  Bilateral and Onychomycosis -  Bilateral    Pedal Pulses:   Right: Present  Left: Present    Posterior Tibialis Pulses:   Right:Present  Left: Present    Neurologic: No focal " deficits, Cranial Nerves II-XII are grossly intact.   Psychiatric: Cooperative, Appropriate mood & affect, Normal judgment, Non-suicidal.   Mood and affect: Calm.   Behavior: Relaxed         Assessment:       ICD-10-CM ICD-9-CM   1. Wellness examination  Z00.00 V70.0   2. Controlled type 2 diabetes mellitus with hyperglycemia, without long-term current use of insulin  E11.65 250.80     790.29   3. Hypertension associated with diabetes  E11.59 250.80    I15.2 401.9   4. Hyperlipidemia associated with type 2 diabetes mellitus  E11.69 250.80    E78.5 272.4   5. Screening for colon cancer  Z12.11 V76.51   6. Polyarthritis with positive rheumatoid factor  M05.80 714.0   7. Pre-ulcerative calluses  L84 700   8. Onychomycosis of great toe  B35.1 110.1        Plan:     Problem List Items Addressed This Visit          Cardiac/Vascular    Hypertension associated with diabetes    Relevant Medications    benazepril-hydrochlorthiazide (LOTENSIN HCT) 20-25 mg Tab    Hyperlipidemia associated with type 2 diabetes mellitus    Relevant Medications    atorvastatin (LIPITOR) 80 MG tablet       Immunology/Multi System    Polyarthritis with positive rheumatoid factor       Endocrine    Controlled type 2 diabetes mellitus with hyperglycemia, without long-term current use of insulin    Relevant Orders    Diabetic Eye Screening Photo    Microalbumin/Creatinine Ratio, Urine    Ambulatory referral/consult to Podiatry    Hemoglobin A1C     Other Visit Diagnoses         Wellness examination    -  Primary    Relevant Medications    biotin-keratin (BIOTIN PLUS KERATIN) 10,000-100 mcg-mg Tab    multivit-min-iron-FA-vit K-lut (MULTIVITAMIN WOMEN 50 PLUS) 8 mg iron-400 mcg-50 mcg Tab    Other Relevant Orders    Vitamin D      Screening for colon cancer        Relevant Orders    Ambulatory referral/consult to Gastroenterology      Pre-ulcerative calluses        Relevant Orders    Ambulatory referral/consult to Podiatry      Onychomycosis of great  toe        Relevant Medications    ciclopirox (PENLAC) 8 % Soln    Other Relevant Orders    Ambulatory referral/consult to Podiatry         1. Wellness examination  - biotin-keratin (BIOTIN PLUS KERATIN) 10,000-100 mcg-mg Tab; Take 1 tablet by mouth Daily.  Dispense: 90 tablet; Refill: 3  - multivit-min-iron-FA-vit K-lut (MULTIVITAMIN WOMEN 50 PLUS) 8 mg iron-400 mcg-50 mcg Tab; Take 1 tablet by mouth once daily.  Dispense: 90 tablet; Refill: 3  - Vitamin D; Future  - Will treat pending results. Monthly breast self exam encouraged. Diet, exercise, and 10% weight loss encouraged. Keep appointment for dental exams x q6 months as scheduled. Keep appointment for annual eye exam as scheduled. Keep appointment with GYN for annual pap smear as scheduled. Keep appointment with specialists, Notify M.D. or ER if temp greater than 100.4, or any acute illness.      2. Controlled type 2 diabetes mellitus with hyperglycemia, without long-term current use of insulin  - Diabetic Eye Screening Photo; Future  - Microalbumin/Creatinine Ratio, Urine; Future  - Ambulatory referral/consult to Podiatry; Future  - Hemoglobin A1C; Future  - Stable, continue followup with Endocrinology as scheduled.   Lab Results   Component Value Date    HGBA1C 12.2 (H) 06/09/2023      Continue   On ARB and Statin according to guidelines.  Follow ADA Diet. Avoid soda, simple sweets, and limit rice/pasta/breads/starches.  Maintain healthy weight with goal BMI <30.  Exercise 5 times per week for 30 minutes per day.  Stressed importance of daily foot exams.  Stressed importance of annual dilated eye exam.     3. Hypertension associated with diabetes  - benazepril-hydrochlorthiazide (LOTENSIN HCT) 20-25 mg Tab; Take 1 tablet by mouth once daily.  Dispense: 90 tablet; Refill: 3  - BP is well controlled. Continue Lotensin HCT. Continue followup with Cardiology as scheduled. Keep daily BP log. Notify M.D. or ER if BP >170/100 or <90/60, chest pain, palpitations,  headache, SOB, temp greater than 100.4, or any acute illness.   Continue  Low Sodium Diet (DASH Diet - Less than 2 grams of sodium per day).  Monitor blood pressure daily and log. Report consistent numbers greater than 140/90.  Smoking cessation encouraged to aid in BP reduction.  Maintain healthy weight with goal BMI <30. Exercise 30 minutes per day, 5 days per week.      4. Hyperlipidemia associated with type 2 diabetes mellitus  - atorvastatin (LIPITOR) 80 MG tablet; Take 1 tablet (80 mg total) by mouth every evening.  Dispense: 90 tablet; Refill: 3  - Continue Lipitor as prescribed, will treat pending results.   Continue  Stressed importance of dietary modifications. Follow a low cholesterol, low saturated fat diet with less that 200mg of cholesterol a day.  Avoid fried foods and high saturated fats (high saturated fats less than 7% of calories).  Add Flax Seed/Fish Oil supplements to diet. Increase dietary fiber.  Regular exercise can reduce LDL and raise HDL. Stressed importance of physical activity 5 times per week for 30 minutes per day.      5. Screening for colon cancer  - Ambulatory referral/consult to Gastroenterology; Future for screening Colonoscopy.     6. Polyarthritis with positive rheumatoid factor  - Asymptomatic, continue followup with Rheumatology.     7. Pre-ulcerative calluses  - Ambulatory referral/consult to Podiatry; Future for DM II foot care    8. Onychomycosis of great toe  - Ambulatory referral/consult to Podiatry; Future  - Patient not interested in oral Rx, Rx trial of topical ciclopirox (PENLAC) 8 % Soln; Apply topically nightly.  Dispense: 6.6 mL; Refill: 0        Cathie was seen today for annual exam.    Diagnoses and all orders for this visit:    Wellness examination  -     biotin-keratin (BIOTIN PLUS KERATIN) 10,000-100 mcg-mg Tab; Take 1 tablet by mouth Daily.  -     multivit-min-iron-FA-vit K-lut (MULTIVITAMIN WOMEN 50 PLUS) 8 mg iron-400 mcg-50 mcg Tab; Take 1 tablet by mouth  once daily.  -     Vitamin D; Future    Controlled type 2 diabetes mellitus with hyperglycemia, without long-term current use of insulin  -     Diabetic Eye Screening Photo; Future  -     Microalbumin/Creatinine Ratio, Urine; Future  -     Ambulatory referral/consult to Podiatry; Future  -     Hemoglobin A1C; Future    Hypertension associated with diabetes  -     benazepril-hydrochlorthiazide (LOTENSIN HCT) 20-25 mg Tab; Take 1 tablet by mouth once daily.    Hyperlipidemia associated with type 2 diabetes mellitus  -     atorvastatin (LIPITOR) 80 MG tablet; Take 1 tablet (80 mg total) by mouth every evening.    Screening for colon cancer  -     Ambulatory referral/consult to Gastroenterology; Future    Polyarthritis with positive rheumatoid factor    Pre-ulcerative calluses  -     Ambulatory referral/consult to Podiatry; Future    Onychomycosis of great toe  -     Ambulatory referral/consult to Podiatry; Future  -     ciclopirox (PENLAC) 8 % Soln; Apply topically nightly.          Medication List with Changes/Refills   New Medications    CICLOPIROX (PENLAC) 8 % SOLN    Apply topically nightly.       Start Date: 3/13/2025 End Date: --   Current Medications    ASPIRIN (ECOTRIN) 81 MG EC TABLET    Take 81 mg by mouth once daily.       Start Date: --        End Date: --    CALCIUM-VITAMIN D3 (OS-AMADOR 500 + D3) 500 MG-5 MCG (200 UNIT) PER TABLET    Take 1 tablet by mouth once daily.       Start Date: --        End Date: --    CONTOUR NEXT TEST STRIPS STRP    4 (four) times daily.       Start Date: 6/13/2023 End Date: --    GLIPIZIDE (GLUCOTROL) 10 MG TR24    Take 10 mg by mouth 2 (two) times daily.       Start Date: 1/31/2024 End Date: --    GLUCAGON 3 MG/ACTUATION SPRY    3 mg by Nasal route once as needed.       Start Date: --        End Date: --    METFORMIN (GLUCOPHAGE) 500 MG TABLET    Take 1,000 mg by mouth 2 (two) times daily with meals.       Start Date: --        End Date: --    MICROLET LANCET MISC    USE 1  LANCET TO CHECK GLUCOSE 4 TIMES DAILY       Start Date: 6/15/2023 End Date: --    VITAMIN D2 1,250 MCG (50,000 UNIT) CAPSULE    TAKE ONE CAPSULE BY MOUTH ONCE WEEKLY       Start Date: 8/21/2023 End Date: --   Changed and/or Refilled Medications    Modified Medication Previous Medication    ATORVASTATIN (LIPITOR) 80 MG TABLET atorvastatin (LIPITOR) 80 MG tablet       Take 1 tablet (80 mg total) by mouth every evening.    Take 1 tablet (80 mg total) by mouth once daily.       Start Date: 3/13/2025 End Date: --    Start Date: 3/14/2024 End Date: 3/13/2025    BENAZEPRIL-HYDROCHLORTHIAZIDE (LOTENSIN HCT) 20-25 MG TAB benazepril-hydrochlorthiazide (LOTENSIN HCT) 20-25 mg Tab       Take 1 tablet by mouth once daily.    Take 1 tablet by mouth once daily       Start Date: 3/13/2025 End Date: 3/13/2026    Start Date: 12/16/2024End Date: 3/13/2025    BIOTIN-KERATIN (BIOTIN PLUS KERATIN) 10,000-100 MCG-MG TAB biotin-keratin (BIOTIN PLUS KERATIN) 10,000-100 mcg-mg Tab       Take 1 tablet by mouth Daily.    as directed Orally       Start Date: 3/13/2025 End Date: 3/13/2026    Start Date: --        End Date: 3/13/2025    MULTIVIT-MIN-IRON-FA-VIT K-LUT (MULTIVITAMIN WOMEN 50 PLUS) 8 MG IRON-400 MCG-50 MCG TAB multivit-min-iron-FA-vit K-lut (MULTIVITAMIN WOMEN 50 PLUS) 8 mg iron-400 mcg-50 mcg Tab       Take 1 tablet by mouth once daily.    as directed Orally       Start Date: 3/13/2025 End Date: 3/13/2026    Start Date: --        End Date: 3/13/2025          Follow up in about 6 months (around 9/13/2025) for HTN Followup, Diabetes Followup, Cholesterol Followup- 30 minute appointment.        [1]   Social History  Socioeconomic History    Marital status: Unknown   Tobacco Use    Smoking status: Never    Smokeless tobacco: Never   Substance and Sexual Activity    Alcohol use: Yes    Drug use: Never    Sexual activity: Yes     Social Drivers of Health     Financial Resource Strain: Patient Declined (3/12/2025)    Overall Financial  Resource Strain (CARDIA)     Difficulty of Paying Living Expenses: Patient declined   Food Insecurity: Patient Declined (3/12/2025)    Hunger Vital Sign     Worried About Running Out of Food in the Last Year: Patient declined     Ran Out of Food in the Last Year: Patient declined   Transportation Needs: Patient Declined (3/12/2025)    PRAPARE - Transportation     Lack of Transportation (Medical): Patient declined     Lack of Transportation (Non-Medical): Patient declined   Physical Activity: Patient Declined (3/12/2025)    Exercise Vital Sign     Days of Exercise per Week: Patient declined     Minutes of Exercise per Session: Patient declined   Stress: Patient Declined (3/12/2025)    [employer] [place] [employer] - Occupational Stress Questionnaire     Feeling of Stress : Patient declined   Housing Stability: Patient Declined (3/12/2025)    Housing Stability Vital Sign     Unable to Pay for Housing in the Last Year: Patient declined     Homeless in the Last Year: Patient declined

## 2025-03-14 ENCOUNTER — DOCUMENTATION ONLY (OUTPATIENT)
Dept: FAMILY MEDICINE | Facility: CLINIC | Age: 60
End: 2025-03-14
Payer: OTHER GOVERNMENT

## 2025-03-14 ENCOUNTER — TELEPHONE (OUTPATIENT)
Dept: FAMILY MEDICINE | Facility: CLINIC | Age: 60
End: 2025-03-14
Payer: OTHER GOVERNMENT

## 2025-03-17 ENCOUNTER — DOCUMENTATION ONLY (OUTPATIENT)
Dept: FAMILY MEDICINE | Facility: CLINIC | Age: 60
End: 2025-03-17
Payer: OTHER GOVERNMENT

## 2025-03-24 ENCOUNTER — TELEPHONE (OUTPATIENT)
Dept: FAMILY MEDICINE | Facility: CLINIC | Age: 60
End: 2025-03-24
Payer: OTHER GOVERNMENT

## 2025-03-24 DIAGNOSIS — E11.65 CONTROLLED TYPE 2 DIABETES MELLITUS WITH HYPERGLYCEMIA, WITHOUT LONG-TERM CURRENT USE OF INSULIN: ICD-10-CM

## 2025-03-24 DIAGNOSIS — H35.363 DRUSEN OF MACULA OF BOTH EYES: Primary | ICD-10-CM

## 2025-03-25 NOTE — TELEPHONE ENCOUNTER
----- Message from Nurse Lara sent at 3/24/2025  9:46 AM CDT -----  Patient would like referral to eye specialist  ----- Message -----  From: Stacey Hendrickson MD  Sent: 3/21/2025  12:41 PM CDT  To: Jane Gupta LPN    The results were not sent to me for review, but are in her chart and are as follows:- Magnesium level is normal.- CBC is normal, there is no anemia, abnormal white blood cell count, or abnormal platelet count. - CMP is shows normal electrolytes, normal kidney function, and normal liver function. - Your cholesterol is well controlled, continue cholesterol medication as prescribed and low cholesterol eating plan, recheck fasting lipid panel, CMP, CPK in 03/2026. - TSH (thyroid test) is normal. - Urinalysis is clear without any abnormalities. - Continue current treatment plan. Thank you for choosing [facility] for your medical needs. Have a great day!  -Stacey Hendrickson MD, [facility]  ----- Message -----  From: Jane Gupta LPN  Sent: 3/21/2025  11:43 AM CDT  To: Stacey Hendrickson MD    Patient calling for lab results.  ----- Message -----  From: Seven Shah  Sent: 3/21/2025  11:23 AM CDT  To: Madhavi FIORE Staff    Who Called: Cathie Lehman is requesting information on test results.Name of Test (Lab/Mammo/Etc): N/ADate of Test: N/AWhere the test was performed: N/AOrdering Provider: MadhaviPreferred Method of Contact: Phone CallPatient's Preferred Phone Number on File: [phone] Best Call Back Number, if different:Additional Information: pt stated she called earlier in the week and still has not heard back from office. Pt could not specify ,she said she took multiple test

## 2025-04-01 ENCOUNTER — TELEPHONE (OUTPATIENT)
Dept: FAMILY MEDICINE | Facility: CLINIC | Age: 60
End: 2025-04-01
Payer: OTHER GOVERNMENT

## 2025-04-02 ENCOUNTER — TELEPHONE (OUTPATIENT)
Dept: FAMILY MEDICINE | Facility: CLINIC | Age: 60
End: 2025-04-02
Payer: OTHER GOVERNMENT

## 2025-04-02 NOTE — TELEPHONE ENCOUNTER
----- Message from Tech Brianna sent at 4/2/2025 10:19 AM CDT -----  .Type:  Needs Medical AdviceWho Called:  Dr. Chamorro (please be specific):  no How long has patient had these symptoms:   noPharmacy name and phone #:   noWould the patient rather a call back or a response via [facility]?  [facility] Call Back Number:   [phone] Information:  please send pt's Podiatry referral  to Dr. Ibarra fax# [phone] thanks

## 2025-04-18 DIAGNOSIS — B35.1 ONYCHOMYCOSIS OF GREAT TOE: ICD-10-CM

## 2025-04-22 ENCOUNTER — PATIENT MESSAGE (OUTPATIENT)
Dept: FAMILY MEDICINE | Facility: CLINIC | Age: 60
End: 2025-04-22
Payer: OTHER GOVERNMENT

## 2025-04-22 RX ORDER — CICLOPIROX 80 MG/ML
SOLUTION TOPICAL
Qty: 7 ML | Refills: 0 | Status: SHIPPED | OUTPATIENT
Start: 2025-04-22

## 2025-05-06 LAB — CRC RECOMMENDATION EXT: NORMAL

## 2025-05-07 ENCOUNTER — DOCUMENTATION ONLY (OUTPATIENT)
Dept: FAMILY MEDICINE | Facility: CLINIC | Age: 60
End: 2025-05-07
Payer: OTHER GOVERNMENT